# Patient Record
Sex: FEMALE | Race: BLACK OR AFRICAN AMERICAN | Employment: FULL TIME | URBAN - METROPOLITAN AREA
[De-identification: names, ages, dates, MRNs, and addresses within clinical notes are randomized per-mention and may not be internally consistent; named-entity substitution may affect disease eponyms.]

---

## 2017-09-15 ENCOUNTER — HOSPITAL ENCOUNTER (OUTPATIENT)
Dept: VASCULAR SURGERY | Age: 52
Discharge: HOME | End: 2017-09-15
Attending: FAMILY MEDICINE
Payer: COMMERCIAL

## 2017-09-15 DIAGNOSIS — M79.605 LEFT LEG PAIN: ICD-10-CM

## 2017-09-15 PROCEDURE — 93971 EXTREMITY STUDY: CPT

## 2017-09-15 NOTE — PROCEDURES
[facility]  *** FINAL REPORT ***    Name: Everette Barcenas  MRN: [id number]    Outpatient  : [id number]  HIS Order #: [id number]  [id number] [facility] Visit #: [id number]  Date: 15 Sep 2017    TYPE OF TEST: Peripheral Venous Testing    REASON FOR TEST  Limb swelling    Left Leg:-  Deep venous thrombosis:           No  Superficial venous thrombosis:    Not examined  Deep venous insufficiency:        Not examined  Superficial venous insufficiency: Not examined      INTERPRETATION/FINDINGS  Duplex images were obtained using 2-D gray scale, color flow, and  spectral Doppler analysis. Left leg :  1. Deep vein(s) visualized include the common femoral, proximal  femoral, mid femoral, distal femoral, popliteal(above knee),  popliteal(fossa), popliteal(below knee), posterior tibial and peroneal   veins. 2. No evidence of deep venous thrombosis detected in the veins  visualized. 3. No evidence of deep vein thrombosis in the contralateral common  femoral vein. 4. Superficial vein(s) visualized include the great saphenous vein. ADDITIONAL COMMENTS    I have personally reviewed the data relevant to the interpretation of  this  study. TECHNOLOGIST: Rasheed Delarosa, [facility], RVT/  Signed: 09/15/2017 02:45 PM    PHYSICIAN: Derek Dow D.O.   Signed: 2017 09:02 AM

## 2019-03-22 ENCOUNTER — HOSPITAL ENCOUNTER (EMERGENCY)
Age: 54
Discharge: HOME | End: 2019-03-22
Attending: EMERGENCY MEDICINE
Payer: COMMERCIAL

## 2019-03-22 VITALS
DIASTOLIC BLOOD PRESSURE: 80 MMHG | BODY MASS INDEX: 50.04 KG/M2 | WEIGHT: 293 LBS | TEMPERATURE: 98.8 F | SYSTOLIC BLOOD PRESSURE: 141 MMHG | RESPIRATION RATE: 16 BRPM | HEART RATE: 76 BPM | OXYGEN SATURATION: 100 %

## 2019-03-22 DIAGNOSIS — T78.40XA ALLERGIC REACTION, INITIAL ENCOUNTER: Primary | ICD-10-CM

## 2019-03-22 PROCEDURE — 74011636637 HC RX REV CODE- 636/637: Performed by: EMERGENCY MEDICINE

## 2019-03-22 PROCEDURE — 74011250637 HC RX REV CODE- 250/637: Performed by: EMERGENCY MEDICINE

## 2019-03-22 PROCEDURE — 99283 EMERGENCY DEPT VISIT LOW MDM: CPT

## 2019-03-22 RX ORDER — FAMOTIDINE 20 MG/1
20 TABLET, FILM COATED ORAL
Status: COMPLETED | OUTPATIENT
Start: 2019-03-22 | End: 2019-03-22

## 2019-03-22 RX ORDER — PREDNISONE 20 MG/1
60 TABLET ORAL
Status: COMPLETED | OUTPATIENT
Start: 2019-03-22 | End: 2019-03-22

## 2019-03-22 RX ORDER — FAMOTIDINE 20 MG/1
20 TABLET, FILM COATED ORAL DAILY
Qty: 10 TAB | Refills: 0 | Status: SHIPPED | OUTPATIENT
Start: 2019-03-22 | End: 2019-04-01

## 2019-03-22 RX ORDER — PREDNISONE 20 MG/1
60 TABLET ORAL DAILY
Qty: 12 TAB | Refills: 0 | Status: SHIPPED | OUTPATIENT
Start: 2019-03-22 | End: 2019-03-26

## 2019-03-22 RX ADMIN — PREDNISONE 60 MG: 20 TABLET ORAL at 05:02

## 2019-03-22 RX ADMIN — FAMOTIDINE 20 MG: 20 TABLET, FILM COATED ORAL at 05:02

## 2019-03-22 NOTE — ED PROVIDER NOTES
Pt c/o throat tingling and lip tingling/itching and possible lip swelling x 3 days, waxing and waning. Mild. Mild finger itching also, but resolved. No diff swallowing, no sob, no abd pain/chest pain, no prior similar sx's. No meds taken for sx's. Took off brand motrin prior to sx's starting, no other new meds or possible allergens per pt. Past Medical History:  
Diagnosis Date  Arthritis of both knees  Asthma  Chronic back pain  Left foot pain  Morbid obesity ([facility] [address]  Morbid obesity with BMI of 50.0-59.9, adult ([facility] [address] Past Surgical History:  
Procedure Laterality Date  HX HERNIA REPAIR    
 bilat inguinal  
 
   
No family history on file. Social History Socioeconomic History  Marital status: UNKNOWN Spouse name: Not on file  Number of children: Not on file  Years of education: Not on file  Highest education level: Not on file Occupational History  Not on file Social Needs  Financial resource strain: Not on file  Food insecurity:  
  Worry: Not on file Inability: Not on file  Transportation needs:  
  Medical: Not on file Non-medical: Not on file Tobacco Use  Smoking status: Never Smoker Substance and Sexual Activity  Alcohol use: Yes Comment: social/ occ  Drug use: No  
 Sexual activity: Never Lifestyle  Physical activity:  
  Days per week: Not on file Minutes per session: Not on file  Stress: Not on file Relationships  Social connections:  
  Talks on phone: Not on file Gets together: Not on file Attends Rastafari service: Not on file Active member of club or organization: Not on file Attends meetings of clubs or organizations: Not on file Relationship status: Not on file  Intimate partner violence:  
  Fear of current or ex partner: Not on file Emotionally abused: Not on file Physically abused: Not on file Forced sexual activity: Not on file Other Topics Concern  Not on file Social History Narrative  Not on file ALLERGIES: Penicillins Review of Systems Constitutional: Negative for fever. HENT: Negative for congestion. Respiratory: Negative for cough and shortness of breath. Cardiovascular: Negative for chest pain. Gastrointestinal: Negative for abdominal pain and vomiting. Musculoskeletal: Negative for back pain. Skin: Negative for rash. Neurological: Negative for light-headedness. All other systems reviewed and are negative. Vitals:  
 03/22/19 9026 BP: 141/80 Pulse: 76 Resp: 16 Temp: 98.8 °F (37.1 °C) SpO2: 100% Weight: 140.6 kg (310 lb) Physical Exam  
Constitutional: She is oriented to person, place, and time. She appears well-developed. HENT:  
Head: Normocephalic and atraumatic. Nl oropharyngeal exam. No uvula/pharyngeal/lip swelling. florencio secretions without diff. Eyes: Pupils are equal, round, and reactive to light. Neck: Normal range of motion. Cardiovascular: Normal rate and regular rhythm. No murmur heard. Pulmonary/Chest: Effort normal. She has no wheezes. Abdominal: Soft. There is no tenderness. Musculoskeletal: She exhibits no tenderness. Neurological: She is alert and oriented to person, place, and time. Skin: Skin is dry. Capillary refill takes less than 2 seconds. No rash noted. She is not diaphoretic. Psychiatric: She has a normal mood and affect. Nursing note and vitals reviewed. MDM Procedures Vitals: 
Patient Vitals for the past 12 hrs: 
 Temp Pulse Resp BP SpO2  
03/22/19 0439 98.8 °F (37.1 °C) 76 16 141/80 100 % Medications ordered:  
Medications  
famotidine (PEPCID) tablet 20 mg (20 mg Oral Given 3/22/19 0502) predniSONE (DELTASONE) tablet 60 mg (60 mg Oral Given 3/22/19 0502) Lab findings: 
No results found for this or any previous visit (from the past 12 hour(s)). X-Ray, CT or other radiology findings or impressions: No orders to display Progress notes, Consult notes or additional Procedure notes:  
No s/o rxn. To start meds for poss rxn. No indication for epipen. No emc. Stable for dc and close f/u. Detailed ret inst given. pt to avoid new off brand motrin she tried this week. Stable for dc and close f/u. Pt verbalizes understanding of detailed return instructions given. No emc. No cardioresp sx's. No oropharyngeal swelling. Diagnosis: 1. Allergic reaction, initial encounter Disposition: home Follow-up Information Follow up With Specialties Details Why Contact Info Oma Poe MD Internal Medicine Schedule an appointment as soon as possible for a visit in 1 day  [phone] [facility] [address] 
[phone] [id number] [facility] EMERGENCY DEPT Emergency Medicine Go to As needed, If symptoms worsen [address] [facility] [id number] [phone] Discharge Medication List as of 3/22/2019  4:59 AM  
  
START taking these medications Details  
famotidine (PEPCID) 20 mg tablet Take 1 Tab by mouth daily for 10 days. , Print, Disp-10 Tab, R-0  
  
predniSONE (DELTASONE) 20 mg tablet Take 60 mg by mouth daily for 4 days. , Print, Disp-12 Tab, R-0

## 2019-03-22 NOTE — LETTER
NOTIFICATION RETURN TO WORK / SCHOOL 
 
3/22/2019 4:49 AM 
 
Ms. Aureliano Ibrahim [address] [address] To Whom It May Concern: Aureliano Ibrahim is currently under the care of [id number] [facility] EMERGENCY DEPT. She will return to work/school on: 3/25/19 If there are questions or concerns please have the patient contact our office.  
 
 
 
Sincerely, 
 
 
Anamaria Norman MD

## 2019-03-22 NOTE — DISCHARGE INSTRUCTIONS
Return for pain, any difficulty swallowing or swelling, fever not resolving with motrin or tylenol, any shortness of breath, vomiting, decreased fluid intake, weakness, numbness, dizziness, or any change or concerns. Take benadryl over the counter as directed for the next 24 hours, then as needed. Patient Education        Allergic Reaction: Care Instructions  Your Care Instructions    An allergic reaction is an excessive response from your immune system to a medicine, chemical, food, insect bite, or other substance. A reaction can range from mild to life-threatening. Some people have a mild rash, hives, and itching or stomach cramps. In severe reactions, swelling of your tongue and throat can close up your airway so that you cannot breathe. Follow-up care is a key part of your treatment and safety. Be sure to make and go to all appointments, and call your doctor if you are having problems. It's also a good idea to know your test results and keep a list of the medicines you take. How can you care for yourself at home? · If you know what caused your allergic reaction, be sure to avoid it. Your allergy may become more severe each time you have a reaction. · Take an over-the-counter antihistamine, such as cetirizine (Zyrtec) or loratadine (Claritin), to treat mild symptoms. Read and follow directions on the label. Some antihistamines can make you feel sleepy. Do not give antihistamines to a child unless you have checked with your doctor first. Mild symptoms include sneezing or an itchy or runny nose; an itchy mouth; a few hives or mild itching; and mild nausea or stomach discomfort. · Do not scratch hives or a rash. Put a cold, moist towel on them or take cool baths to relieve itching. Put ice packs on hives, swelling, or insect stings for 10 to 15 minutes at a time. Put a thin cloth between the ice pack and your skin. Do not take hot baths or showers. They will make the itching worse.   · Your doctor may prescribe a shot of epinephrine to carry with you in case you have a severe reaction. Learn how to give yourself the shot and keep it with you at all times. Make sure it is not . · Go to the emergency room every time you have a severe reaction, even if you have used your shot of epinephrine and are feeling better. Symptoms can come back after a shot. · Wear medical alert jewelry that lists your allergies. You can buy this at most drugstores. · If your child has a severe allergy, make sure that his or her teachers, babysitters, coaches, and other caregivers know about the allergy. They should have an epinephrine shot, know how and when to give it, and have a plan to take your child to the hospital.  When should you call for help? Give an epinephrine shot if:    · You think you are having a severe allergic reaction.     · You have symptoms in more than one body area, such as mild nausea and an itchy mouth.    After giving an epinephrine shot call 911, even if you feel better.   Call 911 if:    · You have symptoms of a severe allergic reaction. These may include:  ? Sudden raised, red areas (hives) all over your body. ? Swelling of the throat, mouth, lips, or tongue. ? Trouble breathing. ? Passing out (losing consciousness). Or you may feel very lightheaded or suddenly feel weak, confused, or restless.     · You have been given an epinephrine shot, even if you feel better.    Call your doctor now or seek immediate medical care if:    · You have symptoms of an allergic reaction, such as:  ? A rash or hives (raised, red areas on the skin). ? Itching. ? Swelling. ? Belly pain, nausea, or vomiting.    Watch closely for changes in your health, and be sure to contact your doctor if:    · You do not get better as expected. Where can you learn more? Go to http://kee-delfin.info/. Enter S687 in the search box to learn more about \"Allergic Reaction: Care Instructions. \"  Current as of: June 27, 2018  Content Version: 11.9  © 2889-9942 [employer]. Care instructions adapted under license by [employer] (which disclaims liability or warranty for this information). If you have questions about a medical condition or this instruction, always ask your healthcare professional. [address] any warranty or liability for your use of this information.

## 2019-03-22 NOTE — ED TRIAGE NOTES
Patient c/o possible allergic reaction with c/o lip swelling and hoarse voice. She states it started last Tuesday and went away and presented again tonight. Denies any new medications, makeup or lotions. Patient states she started taking a OTC Ibuprofen gelcap that she doesn't normally take, for arthritic pain.

## 2019-06-24 ENCOUNTER — HOSPITAL ENCOUNTER (OUTPATIENT)
Dept: GENERAL RADIOLOGY | Age: 54
Discharge: HOME | End: 2019-06-24
Payer: COMMERCIAL

## 2019-06-24 ENCOUNTER — HOSPITAL ENCOUNTER (OUTPATIENT)
Dept: LAB | Age: 54
Discharge: HOME | End: 2019-06-24
Payer: COMMERCIAL

## 2019-06-24 DIAGNOSIS — M79.651 ACUTE PAIN OF RIGHT THIGH: ICD-10-CM

## 2019-06-24 DIAGNOSIS — M25.511 RIGHT SHOULDER PAIN: ICD-10-CM

## 2019-06-24 LAB — SENTARA SPECIMEN COL,SENBCF: NORMAL

## 2019-06-24 PROCEDURE — 99001 SPECIMEN HANDLING PT-LAB: CPT

## 2019-06-24 PROCEDURE — 73030 X-RAY EXAM OF SHOULDER: CPT

## 2019-06-24 PROCEDURE — 73501 X-RAY EXAM HIP UNI 1 VIEW: CPT

## 2021-06-14 ENCOUNTER — TRANSCRIBE ORDER (OUTPATIENT)
Dept: SCHEDULING | Age: 56
End: 2021-06-14

## 2021-06-14 DIAGNOSIS — Z12.39 ENCOUNTER FOR OTHER SCREENING FOR MALIGNANT NEOPLASM OF BREAST: Primary | ICD-10-CM

## 2021-11-10 ENCOUNTER — TRANSCRIBE ORDER (OUTPATIENT)
Dept: SCHEDULING | Age: 56
End: 2021-11-10

## 2021-11-10 DIAGNOSIS — Z12.31 VISIT FOR SCREENING MAMMOGRAM: Primary | ICD-10-CM

## 2022-06-06 ENCOUNTER — OFFICE VISIT (OUTPATIENT)
Dept: SURGERY | Age: 57
End: 2022-06-06
Payer: COMMERCIAL

## 2022-06-06 VITALS
BODY MASS INDEX: 45.96 KG/M2 | WEIGHT: 286 LBS | OXYGEN SATURATION: 100 % | SYSTOLIC BLOOD PRESSURE: 139 MMHG | HEART RATE: 89 BPM | HEIGHT: 66 IN | TEMPERATURE: 97.5 F | DIASTOLIC BLOOD PRESSURE: 73 MMHG

## 2022-06-06 DIAGNOSIS — Z98.84 S/P LAPAROSCOPIC SLEEVE GASTRECTOMY: ICD-10-CM

## 2022-06-06 DIAGNOSIS — J45.909 UNCOMPLICATED ASTHMA, UNSPECIFIED ASTHMA SEVERITY, UNSPECIFIED WHETHER PERSISTENT: ICD-10-CM

## 2022-06-06 DIAGNOSIS — G89.29 CHRONIC MIDLINE LOW BACK PAIN WITH SCIATICA, SCIATICA LATERALITY UNSPECIFIED: ICD-10-CM

## 2022-06-06 DIAGNOSIS — M19.90 OSTEOARTHRITIS, UNSPECIFIED OSTEOARTHRITIS TYPE, UNSPECIFIED SITE: ICD-10-CM

## 2022-06-06 DIAGNOSIS — E66.01 MORBID OBESITY WITH BMI OF 45.0-49.9, ADULT (HCC): ICD-10-CM

## 2022-06-06 DIAGNOSIS — M17.0 ARTHRITIS OF BOTH KNEES: ICD-10-CM

## 2022-06-06 DIAGNOSIS — E66.01 MORBID OBESITY (HCC): Primary | ICD-10-CM

## 2022-06-06 DIAGNOSIS — D64.9 ANEMIA, UNSPECIFIED TYPE: ICD-10-CM

## 2022-06-06 DIAGNOSIS — E55.9 VITAMIN D DEFICIENCY: ICD-10-CM

## 2022-06-06 DIAGNOSIS — M54.40 CHRONIC MIDLINE LOW BACK PAIN WITH SCIATICA, SCIATICA LATERALITY UNSPECIFIED: ICD-10-CM

## 2022-06-06 DIAGNOSIS — K90.9 INTESTINAL MALABSORPTION, UNSPECIFIED TYPE: ICD-10-CM

## 2022-06-06 PROCEDURE — 99215 OFFICE O/P EST HI 40 MIN: CPT | Performed by: SPECIALIST

## 2022-06-06 RX ORDER — CYCLOBENZAPRINE HYDROCHLORIDE 7.5 MG/1
TABLET, FILM COATED ORAL
COMMUNITY

## 2022-06-06 RX ORDER — ERGOCALCIFEROL 1.25 MG/1
CAPSULE ORAL
COMMUNITY

## 2022-06-06 RX ORDER — NAPROXEN 500 MG/1
TABLET ORAL
COMMUNITY
Start: 2022-05-19

## 2022-06-06 RX ORDER — DICLOFENAC SODIUM 10 MG/G
GEL TOPICAL
COMMUNITY
Start: 2022-04-11

## 2022-06-06 NOTE — PROGRESS NOTES
Revision Surgery Consultation    Subjective: The patient is a 49 y.o. obese female with a Body mass index is 46.16 kg/m². .  The patient had a laparoscopic sleeve gastrectomy procedure done approximately 1.83 years ago in [facility] by Dr. Margo Gómez.  her starting weight prior to surgery was 340 lbs. she ultimately lost approximately 65 lbs with a subsequent weight regain of 11 lbs. her peak EBWL at 1.5 years out from surgery was 32% and now her current EBWL is 27%. her last bariatric follow-up was 1.5 years ago with Dr. Margo Gómez. Deborah Rosales notes that she had no issues in the immediate post-op phase and had no hospital readmissions in the remote post-op phase. she currently is having the following issues related to her health:lack of weight loss. she is here today to discuss a possible revision of her sleeve gastrectomy because of lack of weight loss. All of their prior evaluations available by both their PCP's and specialists physicians have been reviewed today either in the Care Everywhere portal or scanned under the media tab. I have spent a large portion of my initial consultation today reviewing the patients current dietary habits which have contributed to their health issues, weight regain and  their current obesity. They understand that generally speaking,  weight regain is  a function of resuming less that ideal dietary habits instead of being a procedural issue. They understand that older procedures are more likely to be associated with a less that perfect procedural result, such as a prior vertical banded gastroplasty or non divided gastric bypass. These procedures are more likely to result in staple line failures with resultant weight regain. This has been explained to the patient via diagrams of these older procedures and given to the patient. I have suggested to them personally a dietary regimen that they can initiate now to help with their status as it pertains to their weight.   They understand that the most important aspect of their journey through their weight loss endeavor will be their adherence to a new lifestyle of healthy eating behavior. They also understand that an adherence to an exercise program will not only help with weight loss but is ultimately important in weight maintenance.       Patient Active Problem List    Diagnosis Date Noted    Intestinal malabsorption 06/06/2022    S/P laparoscopic sleeve gastrectomy 06/06/2022    Left foot pain     Vitamin D deficiency     Morbid obesity with BMI of 45.0-49.9, adult (HCC)     Anemia     DJD (degenerative joint disease)     Chronic back pain     Morbid obesity ([facility] [address]     Arthritis of both knees     Asthma       Past Surgical History:   Procedure Laterality Date    HX HERNIA REPAIR      bilat inguinal    HX ORTHOPAEDIC      foot surgeries    GA LAP, ULISES RESTRICT PROC, LONGITUDINAL GASTRECTOMY  07/27/2020    Dr Leticia Fierro      Social History     Tobacco Use    Smoking status: Never Smoker    Smokeless tobacco: Never Used   Substance Use Topics    Alcohol use: Yes     Comment: social/ occ      Family History   Adopted: Yes      Current Outpatient Medications   Medication Sig Dispense Refill    Cyclobenzaprine 7.5 mg tablet cyclobenzaprine 7.5 mg tablet   TAKE 1 TABLET BY MOUTH EVERYDAY AT BEDTIME      diclofenac (VOLTAREN) 1 % gel APPLY 1 GRAM TO THE AFFFECTED AREA(S) TWICE A DAY FOR 30 DAYS EXTERNAL TWICE A DAY 30 DAYS      ergocalciferol (ERGOCALCIFEROL) 1,250 mcg (50,000 unit) capsule ergocalciferol (vitamin D2) 1,250 mcg (50,000 unit) capsule   TAKE 1 CAP BY MOUTH ONCE A WEEK      naproxen (NAPROSYN) 500 mg tablet TAKE 1 TABLET WITH FOOD OR MILK AS NEEDED ORALLY EVERY 12 HOURS FOR 15 DAYS       Allergies   Allergen Reactions    Penicillin G Hives     Swelling    Penicillins Hives and Itching    Stings [Sting, Bee] Hives     Swelling ,shortness of breath          Review of Systems:            General - No history or complaints of unexpected fever, chills, or weight loss  Head/Neck - No history or complaints of headache, diplopia, dysphagia, hearing loss  Cardiac - No history or complaints of chest pain, palpitations, murmur, or shortness of breath  Pulmonary - No history or complaints of shortness of breath, productive cough, hemoptysis  Gastrointestinal - No history or complaints of reflux,  abdominal pain, obstipation/constipation, blood per rectum  Genitourinary - No history or complaints of hematuria/dysuria, stress urinary incontinence symptoms, or renal lithiasis  Musculoskeletal - No history or complaints of joint pain or muscular weakness  Hematologic - No history or complaints of bleeding disorders, blood transfusions, sickle cell anemia  Neurologic - No history or complaints of  migraine headaches, seizure activity, syncopal episodes, TIA or stroke  Integumentary - No history or complaints of rashes, abnormal nevi, skin cancer  Gynecological - No history of heavy menses/abnormal menses           Objective:     Visit Vitals  /73 (BP 1 Location: Right upper arm, BP Patient Position: Sitting, BP Cuff Size: Large adult long)   Pulse 89   Temp 97.5 °F (36.4 °C)   Ht 5' 6\" (1.676 m)   Wt 129.7 kg (286 lb)   SpO2 100%   BMI 46.16 kg/m²       Physical Examination: General appearance - alert, well appearing, and in no distress and oriented to person, place, and time  Mental status - alert, oriented to person, place, and time, normal mood, behavior, speech, dress, motor activity, and thought processes  Eyes - pupils equal and reactive, extraocular eye movements intact, sclera anicteric, left eye normal, right eye normal  Ears - right ear normal, left ear normal  Nose - normal and patent, no erythema, discharge or polyps  Mouth - mucous membranes moist, pharynx normal without lesions  Neck - supple, no significant adenopathy  Lymphatics - no palpable lymphadenopathy, no hepatosplenomegaly  Chest - clear to auscultation, no wheezes, rales or rhonchi, symmetric air entry  Heart - normal rate, regular rhythm, normal S1, S2, no murmurs, rubs, clicks or gallops  Abdomen - soft, nontender, nondistended, no masses or organomegaly, Short xiphoid to umbilical distance  Back exam - full range of motion, no tenderness, palpable spasm or pain on motion  Neurological - alert, oriented, normal speech, no focal findings or movement disorder noted  Musculoskeletal - no joint tenderness, deformity or swelling  Extremities - peripheral pulses normal, no pedal edema, no clubbing or cyanosis  Skin - normal coloration and turgor, no rashes, no suspicious skin lesions noted    Labs / Old Records:         OPERATION    LOCATION OF PATIENT:  Room: - [facility]  Patient: Omari Gonzalez  STACI: [id number]    DATE OF OPERATION:  7/27/2020  Case Tracking Events   Event Time In   Procedure Start 07/27/2020 6002   Procedure End       PREOPERATIVE DIAGNOSIS:   Morbid obesity. POSTOPERATIVE DIAGNOSIS:   Morbid obesity. Hepatomegaly with fatty changes grossly   OPERATION PERFORMED:   Laparoscopic vertical sleeve gastrectomy. Wedge Liver Biopsy  SURGEON: Yimi Simmons MD     ASSISTANT: Tang Braun. The assistant was essential in aiding with exposure, retraction, and hemostasis of the surgical field during the operative procedure. ANESTHESIA:General    Patient received Cipro IV piggyback preoperatively. ESTIMATED BLOOD LOSS: Minimal  SPECIMEN: Wedge Liver Biopsy   DEVICES/IMPLANTS: none  COMPLICATIONS: None  TIME OF SURGERY: See OR record   INDICATIONS FOR OPERATION:   This is a 16-year-old morbidly obese black female presents for evaluation concerning a possible laparoscopic vertical sleeve gastrectomy for weight control. She has had slowly worsening obesity throughout her entire life. She now has worsening medical comorbidities associated with obesity. She had tried multiple weight loss plans in the past with no long-term success. She is 5 feet 6 inches tall, weighs 340 pounds. This gives a body mass index approximately 55.1. Her ideal body weight is somewhere around 155 pounds. She has associated medical problems including chronic back pain, shortness of breath on exertion, history of asthma, and degenerative joint disease. She has undergone extensive evaluation preoperatively including both dietary and psychological consultations. She is to undergo a laparoscopic vertical sleeve gastrectomy for weight control.     FINDINGS:   In the operating suite, under general anesthetic, CO2   pneumoperitoneum was accomplished using the Optiview scope in the   left upper quadrant area. Upon visualizing the intraabdominal   contents, there was evidence of fatty changes of the liver. There were no other obvious abnormalities. All   remaining trocars were placed under direct visualization. A 5 mm   trocar in the right lateral abdomen, two 5 mm trocars in the left   lateral abdomen, and a 15 mm trocar just above and to the right of   the umbilicus. A small stab incision was made in the subxiphoid   area for placing the [facility] retractor. The stomach was   mobilized using the Harmonic Scalpel. Hemostasis was   accomplished with this device as well as a hemoclip applier. The sleeve gastrectomy was accomplished using a 36 Kazakh VisiGi   dilator placed orogastrically by the anesthesiologist as a sizer and decompressor tube   for the remaining tube of stomach. The specimen was removed from   the 15mm port site. The fascia at this port site  was closed with 0 PDS suture. DESCRIPTION OF OPERATION:   The patient was taken to the operating suite, placed in supine   position and given a general anesthetic. The abdomen was then   sterilely prepped and draped. The patient was then placed in a   mild reverse Trendelenburg position.  The skin incision lines   were all marked and 0.5% Marcaine with epinephrine was used to   infiltrate the skin and fascia of each of the trocar sites. The   Optiview scope was used to obtain pneumoperitoneum without   difficulty. All remaining trocars were placed as noted above in   the findings. The dissection was begun by incising the   peritoneum at the angle of His, and visualizing the left eloy of   the diaphragm. This was fairly easily identified. There was no   evidence of a hiatal hernia. The redundant fatty pad was excised from this  area, removed from the abdomen, and discarded. Mobilization of the greater curve of the stomach   was then accomplished using the Harmonic Scalpel by mobilizing   the mid portion of the greater curvature and continuing this all   the way up to the previously identified left eloy of the   diaphragm. The short gastric vessels were carefully taken down. Hemostasis was noted to be quite good with using the ultrasonic dissector and the hemaclip applier. Dissection was then continued more distally along the   greater curve of the stomach, further towards the antrum, all the   way down to approximately 4-5 cm proximal to the pylorus. The   filmy adhesions posterior to the stomach were taken down in   similar fashion. Once this was accomplished, the orogastric 36 Japanese Masina 49  tube was advanced by the anesthesiologist. The resection was begun approximately 4-5 cm proximal to the pyloric valve. The 39 Peruvian dilator   was used to size the remaining gastric sleeve, and a MALIKA stapling   technique was used to take this all way up to the angle of His. The first stapling was with a purple load. All remaining   staplings were also done with the purple reloads. Once the fundus and   remainder of the stomach was completely  from the sleeve,   the bed was then carefully observed. Hemostasis was checked   for and noted to be quite good. The integrity of the staple line was then   checked using saline, approximately 30- 40 cc were   insufflated. There was no evidence of any leakage.  This was   then all suctioned free via the orogastric tube, and the   orogastric tube subsequently removed. A wedge liver biopsy was then obtained sharply from the anterior aspect of the left lobe of the liver. The specimen removed and sent to pathology. Hemostasis was easily accomplished using electrocautery. The resected stomach was then grasped and pulled up   through the 15 mm trocar site. The 15 mm trocar was removed. The fascia at this site was stretched minimally to allow removal   of the remaining specimen. This area was then irrigated with   copious amounts of saline. The fascial defect of the 15 mm   trocar was closed with 0 PDS sutures in interrupted fashion. All   remaining trocars were then removed under direct visualization. Hemostasis at the abdominal wall trocar sites was noted to be   quite good as well. The patient was then returned to the supine   position. Pneumoperitoneum was completely released. The   subcutaneous tissue was irrigated with copious amounts of saline. Effluent was suctioned free, and the skin was closed with   interrupted subcuticular sutures of 4-0 Monocryl. Sterile   dressings were applied over the incision sites. The patient was   awakened, taken to the recovery room in stable condition. Sponge, needle, instrument count were correct x2 at the end of the  procedure. CONDITION AT TERMINATION OF OPERATION:   The patient was returned to the recovery room in stable   condition. ______________________________________   SIGNED: Ankit Moreno MD*                  Assessment:     Morbid obesity status post laparoscopic sleeve gastrectomy procedure approximately 1.83 years ago with complaint of poor weight loss. Plan: 1. Weight regain-Today in our office I had a lengthy discussion with Atiya Wall regarding the nature of their prior procedure. We discussed the anatomical changes to their anatomy and how this relates to  contributing weight regain.  Our office will continue to attempt to obtain any medical records, if not obtained or available already ,related to their procedure. It was also discussed today that before any decisions can be made regarding a possible revision of their initial  procedure that an upper GI swallow study must be obtained to evaluate their post surgical anatomy. They understand that in patients with a prior open gastric bypass, those patients are not revision candidates due to adhesive disease usually, and the fact that post surgical anatomy  usually can not be improved upon. They understand if their gastric bypass was performed laparoscopically, then this situation might be more amendable to gastric band placement over their prior gastric bypass. They understand, as I have explained today, that the adhesive disease associated with prior  procedures is at times a rate limiting factor. This precludes our ability to perform a revision procedure safely. The factors that contribute to this are increased risk such as age, health issues and increased risk from a procedural standpoint and have been discussed today. We will proceed with the UGI swallow study as described above. The patient understands all of the above and wishes to proceed with the study. 2.Nutrition-  I have discussed in detail the pitfalls in diet that have contributed to their weight regain and the importance of adhering to a lifelong regimen of dietary goals and proper eating habits. I have discussed the proper lifelong bariatric diet  in detail spending in excess of 20 minutes discussing this. We will schedule them for a dietary consultation with our nutritionist and urge them to continue on a regular follow-up schedule with her. 3.Maintenance vitamins- Today we have discussed the importance of vitamins as it pertains to their procedure and we will obtain appropriate lab to check all levels. They have been provided a handout regarding this today.  I have given the patient a lab slip for her routine labs. Total time spent with the patient reviewing their complex history of bariatric surgery,diet, and plan is in excess of 60 minutes. Secondary Diagnoses:     Restrictive Airway Disease - We will continue all of their pulmonary medications in the form of oral pills and inhalers in both the perioperative and postoperative period. They understand that their symptoms should improve with weight loss. Any further testing related to this will be turned over to their family physician or pulmonologist. The patient understands that if they require oral or IV steroids in the future that they will notify us. This is particularly important for gastric bypass patients at all times and both sleeve gastrectomy and gastric bypass patients in the 1 month pre op and 1 month post operative period. They understand that inhaled steroids are exempt from this. Weight Related Arthritis -The patient understands the benefits that weight loss surgery can have on their arthritis but also understands that weight loss is not a guaranteed cure and relief of symptoms is often dependent on the severity of the underlying disease. The patient also understands that traditional pharmaceutical treatments for this diagnosis are usually unavailable to post-operative weight loss patients due to the effects on the gastrointestinal tract. Any changes to the patients medication treatment will ultimately be made the patients PCP with input by our office. Dietary Intervention  - The patient is currently scheduled to see or has been followed by a bariatric nutritionist for an attempt at preoperative weight loss as has been dictated by their insurance carrier. They will be assessed at various times during their follow up to evaluate their progress depending on the length of time that is required once again by their carrier.   I have explained the importance of preoperative weight loss and the benefits regarding lower surgical risk and also assisting the patient in reaching their weight loss goal. They understand also that they should participate in an exercise program to assist in this weight loss. Finally they understand there is a physiologic benefit from the standpoint of hepatic volume reduction and reduction of central visceral adiposity preoperatively. I have reiterated the importance of a low carbohydrate and high protein regimen to achieve their stated goal. I have reviewed their current eating behavior prior to this encounter and explained to them in an exhaustive fashion the appropriate diet that they should adhere to. They have been encouraged to loose weight pre operatively and understand it is our prerogative to cancel surgery or postpone their procedure in the event of significant weight gain. The patients weight loss goal pre operatively is 10 pounds. Total time spent with the patient reviewing their complex history of bariatric surgery,diet, and plan is in excess of 60 minutes.     Signed By: Xavier Quiroz MD     June 6, 2022

## 2022-06-22 ENCOUNTER — APPOINTMENT (OUTPATIENT)
Dept: GENERAL RADIOLOGY | Age: 57
End: 2022-06-22
Attending: SPECIALIST
Payer: COMMERCIAL

## 2022-06-22 ENCOUNTER — HOSPITAL ENCOUNTER (OUTPATIENT)
Dept: LAB | Age: 57
Discharge: HOME | End: 2022-06-22
Payer: COMMERCIAL

## 2022-06-22 ENCOUNTER — HOSPITAL ENCOUNTER (OUTPATIENT)
Age: 57
Setting detail: OUTPATIENT SURGERY
Discharge: HOME | End: 2022-06-22
Attending: SPECIALIST | Admitting: SPECIALIST
Payer: COMMERCIAL

## 2022-06-22 VITALS
TEMPERATURE: 97.8 F | WEIGHT: 274.5 LBS | HEIGHT: 66 IN | RESPIRATION RATE: 16 BRPM | HEART RATE: 55 BPM | BODY MASS INDEX: 44.11 KG/M2 | SYSTOLIC BLOOD PRESSURE: 139 MMHG | OXYGEN SATURATION: 100 % | DIASTOLIC BLOOD PRESSURE: 74 MMHG

## 2022-06-22 DIAGNOSIS — E66.01 MORBID OBESITY WITH BMI OF 45.0-49.9, ADULT (HCC): ICD-10-CM

## 2022-06-22 DIAGNOSIS — E66.01 MORBID OBESITY (HCC): ICD-10-CM

## 2022-06-22 DIAGNOSIS — E55.9 VITAMIN D DEFICIENCY: ICD-10-CM

## 2022-06-22 DIAGNOSIS — J45.909 UNCOMPLICATED ASTHMA, UNSPECIFIED ASTHMA SEVERITY, UNSPECIFIED WHETHER PERSISTENT: ICD-10-CM

## 2022-06-22 DIAGNOSIS — K90.9 INTESTINAL MALABSORPTION, UNSPECIFIED TYPE: ICD-10-CM

## 2022-06-22 DIAGNOSIS — D64.9 ANEMIA, UNSPECIFIED TYPE: ICD-10-CM

## 2022-06-22 DIAGNOSIS — M17.0 ARTHRITIS OF BOTH KNEES: ICD-10-CM

## 2022-06-22 DIAGNOSIS — Z98.84 S/P LAPAROSCOPIC SLEEVE GASTRECTOMY: ICD-10-CM

## 2022-06-22 LAB
25(OH)D3 SERPL-MCNC: 61.3 NG/ML (ref 30–100)
ALBUMIN SERPL-MCNC: 3.8 G/DL (ref 3.4–5)
ALBUMIN/GLOB SERPL: 1 {RATIO} (ref 0.8–1.7)
ALP SERPL-CCNC: 91 U/L (ref 45–117)
ALT SERPL-CCNC: 16 U/L (ref 13–56)
ANION GAP SERPL CALC-SCNC: 2 MMOL/L (ref 3–18)
AST SERPL-CCNC: 10 U/L (ref 10–38)
BASOPHILS # BLD: 0.1 K/UL (ref 0–0.1)
BASOPHILS NFR BLD: 1 % (ref 0–2)
BILIRUB SERPL-MCNC: 0.5 MG/DL (ref 0.2–1)
BUN SERPL-MCNC: 12 MG/DL (ref 7–18)
BUN/CREAT SERPL: 16 (ref 12–20)
CALCIUM SERPL-MCNC: 9.4 MG/DL (ref 8.5–10.1)
CHLORIDE SERPL-SCNC: 110 MMOL/L (ref 100–111)
CO2 SERPL-SCNC: 30 MMOL/L (ref 21–32)
CREAT SERPL-MCNC: 0.77 MG/DL (ref 0.6–1.3)
DIFFERENTIAL METHOD BLD: ABNORMAL
EOSINOPHIL # BLD: 0.2 K/UL (ref 0–0.4)
EOSINOPHIL NFR BLD: 3 % (ref 0–5)
ERYTHROCYTE [DISTWIDTH] IN BLOOD BY AUTOMATED COUNT: 15.2 % (ref 11.6–14.5)
FERRITIN SERPL-MCNC: 22 NG/ML (ref 8–388)
FOLATE SERPL-MCNC: 16 NG/ML (ref 3.1–17.5)
GLOBULIN SER CALC-MCNC: 4 G/DL (ref 2–4)
GLUCOSE SERPL-MCNC: 92 MG/DL (ref 74–99)
HCT VFR BLD AUTO: 38 % (ref 35–45)
HGB BLD-MCNC: 11.7 G/DL (ref 12–16)
IMM GRANULOCYTES # BLD AUTO: 0 K/UL (ref 0–0.04)
IMM GRANULOCYTES NFR BLD AUTO: 0 % (ref 0–0.5)
IRON SERPL-MCNC: 41 UG/DL (ref 50–175)
LYMPHOCYTES # BLD: 2 K/UL (ref 0.9–3.6)
LYMPHOCYTES NFR BLD: 27 % (ref 21–52)
MCH RBC QN AUTO: 25.4 PG (ref 24–34)
MCHC RBC AUTO-ENTMCNC: 30.8 G/DL (ref 31–37)
MCV RBC AUTO: 82.6 FL (ref 78–100)
MONOCYTES # BLD: 0.4 K/UL (ref 0.05–1.2)
MONOCYTES NFR BLD: 5 % (ref 3–10)
NEUTS SEG # BLD: 4.7 K/UL (ref 1.8–8)
NEUTS SEG NFR BLD: 64 % (ref 40–73)
NRBC # BLD: 0 K/UL (ref 0–0.01)
NRBC BLD-RTO: 0 PER 100 WBC
PLATELET # BLD AUTO: 253 K/UL (ref 135–420)
PMV BLD AUTO: 12.4 FL (ref 9.2–11.8)
POTASSIUM SERPL-SCNC: 3.8 MMOL/L (ref 3.5–5.5)
PROT SERPL-MCNC: 7.8 G/DL (ref 6.4–8.2)
RBC # BLD AUTO: 4.6 M/UL (ref 4.2–5.3)
SODIUM SERPL-SCNC: 142 MMOL/L (ref 136–145)
VIT B12 SERPL-MCNC: 561 PG/ML (ref 211–911)
WBC # BLD AUTO: 7.3 K/UL (ref 4.6–13.2)

## 2022-06-22 PROCEDURE — 36415 COLL VENOUS BLD VENIPUNCTURE: CPT

## 2022-06-22 PROCEDURE — 82607 VITAMIN B-12: CPT

## 2022-06-22 PROCEDURE — 82728 ASSAY OF FERRITIN: CPT

## 2022-06-22 PROCEDURE — 82306 VITAMIN D 25 HYDROXY: CPT

## 2022-06-22 PROCEDURE — 74240 X-RAY XM UPR GI TRC 1CNTRST: CPT

## 2022-06-22 PROCEDURE — 80053 COMPREHEN METABOLIC PANEL: CPT

## 2022-06-22 PROCEDURE — 84425 ASSAY OF VITAMIN B-1: CPT

## 2022-06-22 PROCEDURE — 74240 X-RAY XM UPR GI TRC 1CNTRST: CPT | Performed by: SPECIALIST

## 2022-06-22 PROCEDURE — 76040000019: Performed by: SPECIALIST

## 2022-06-22 PROCEDURE — 85025 COMPLETE CBC W/AUTO DIFF WBC: CPT

## 2022-06-22 PROCEDURE — 83540 ASSAY OF IRON: CPT

## 2022-06-22 PROCEDURE — 74011000250 HC RX REV CODE- 250: Performed by: SPECIALIST

## 2022-06-22 NOTE — PROCEDURES
[facility]    Upper GI Procedure Report      [address] [id number] Record [id number]    1965    Date of Service - June 22, 2022    Pre-Op Diagnosis - patient is status post sleeve resection performed by Dr. Leticia Fierro 1.83 years ago with complaint of poor weight loss and weight regain. They now present for UGI to assess their post surgical anatomy. Post-Op Diagnosis -same    Procedure - UGI study with barium    Surgeon - Wallace Watkins MD    Assistant - None    Complications - None    Specimens - None    Implants - None    Estimate Blood Loss - None    Statement of Medical Necessity - Need for radiologic evaluation prior to further management of their care. .    Procedure -the patient was brought to the fluoroscopy suite where they were given thin barium. On swallowing the barium the patient was noted to have normal peristalsis of their esophagus with progressive flow into the distal esophagus. Specific findings of the distal esophagus revealed that they did not have a hiatal hernia. Contrast flowed normally through the esophagus and into the sleeve without reflux or obstruction. The stomach filled in a timely manner and emptied into the intestine without issue. The anatomy was abnormal for the timeframe in that there appears to be a large amount of retained pre-pyloric stomach with no stricture or obstruction. There is an obvious lack of uniformity of her sleeve   She is a candidate for conversion to a gastric bypass given the abnormal anatomy.     Wallace Watkins MD

## 2022-06-27 LAB — VIT B1 BLD-SCNC: 94.4 NMOL/L (ref 66.5–200)

## 2022-06-30 ENCOUNTER — HOSPITAL ENCOUNTER (OUTPATIENT)
Dept: BARIATRICS/WEIGHT MGMT | Age: 57
Discharge: HOME | End: 2022-06-30

## 2022-06-30 VITALS — BODY MASS INDEX: 44.07 KG/M2 | WEIGHT: 274.2 LBS | HEIGHT: 66 IN

## 2022-06-30 NOTE — PROGRESS NOTES
Medical Weight Loss Multi-Disciplinary Program    Patient's Name: Bam Almonte   Age: 49 y.o. YOB: 1965   Sex: female    Session #1. Pt attended in-person class. Weight obtained in office. Date: 6/30/2022    Visit Vitals  Ht 5' 6\" (1.676 m)   Wt 124.4 kg (274 lb 3.2 oz)   BMI 44.26 kg/m²       Pounds Lost since last month: N/A  Pounds Gained since last month: N/A    Starting Weight: 286  lbs   Previous Months Weight: N/A  Overall Pounds Lost: 11.8 lbs   Overall Pounds Gained: 0.0 lbs    Note that pt has a pre-operative weight loss goal of 10 lbs. Pt has met this goal.    Do you smoke? no    Alcohol intake:  Number of drinks per week: 1-2    Class Guidelines    Guidelines are reviewed with patient at the start of every class. 1. Patient understands that weight loss trial classes must be consecutive. Patient understands if they miss a class, it is their responsibility to contact me to reschedule class. I will reach out to patient after their first no show. 2.  Patient understands the expectations that weight maintenance/weight loss is expected during the classes. Failure to demonstrate changes may result in extension of weight loss trial, followed by returning to see the surgeon. Patient understands that they CANNOT gain any weight during the weight loss trial.  Gaining weight will result in extension of weight loss trial.  3. Patient is also instructed to complete their labwork, psychological evaluation visit, and any other tests that the surgeon has used while they are working on their weight loss trial.  4.  Patient was instructed to bring their packet of nutrition education materials to every class and appointment. Other Pertinent Information    Changes Made Since Last Class: Pt states that she is walking more and doing on-line aerobics. Eating Habits and Behaviors      Today in class we reviewed the Key Diet Principles.   Patient was encouraged to consume 3 meals each day, and meal timing was reviewed. Meal time behaviors that will help pt to be successful with their weight loss efforts were also discussed. We discussed the importance of drinking adequate amounts of fluids, recommending that patient consume a minimum of 64 oz of sugar-free, caffeine-free and carbonation-free fluids each day. Patient was encouraged to eliminate sugar-sweetened beverages such as sweet tea, fruit juice, fruit smoothies, flavored coffee drinks and regular sodas. During the weight loss trial, patient was encouraged to focus on eating protein-forward meals, with a daily goal of  grams protein. Patient was also advised to decrease carbohydrate intake to <100 g/d, choosing complex vs. simple carbohydrates in limited amounts. We also discussed limiting fat intake. Encouraged patient to follow the \"3-gram rule\" when choosing foods by looking for items containing <3 g of fat and sugar per serving. We reviewed meal planning guidelines and discussed appropriate meal and snack options. We also talked about appropriate protein drinks and patient was encouraged to start trying these, using them either for a meal replacement or a protein-rich snack. We reviewed the nutritional guidelines for selecting protein shakes. Pre-operative vitamin and mineral supplementation was reviewed. Patient was instructed to choose a chewable complete multivitamin with iron in NON-gummy form. Selection of probiotic was also reviewed. Patient's current diet habits include:  Patient is eating 3 meals and 3 snacks per day. Pt states that \"often I find myself skipping meals during the day at work, but I eat once home\". Patient is not measuring portions out. Patient indicates they are eating out 0 times per week. This includes fast food, carry out, and sit down restaurants. Per dietary recall, pt is consuming high protein, low carbohydrate diet. Pt is consuming inadequate water at only 16 oz/day.   Pt has selected 3 brands of protein supplement that she will use for meal replacement/high protein snack option pre-operatively and to help meet protein needs post-operatively. Physical Activity/Exercise    Comments:  Patient is currently walking or doing \"routines I find on YouTube\" for activity 15-30 min/d x 2-3 d/wk. We talked about recommended types of physical activity, including walking, swimming, cycling, or chair exercises. I also talked with patient about doing some strength training, which helps the metabolism, as well as strengthen muscles and bones. Patient's plan to incorporate more activity includes: \"increase water intake- Having foot surgery soon, so from there to increase physical activity. Behavior Modification     Comments:  During today's class, we discussed setting SMART goals as behavior change tool to improve eating behaviors, promote weight loss and long-term weight maintenance, encourage a healthy relationship with food, and prevent chronic disease. Patient was directed to the handouts on developing SMART goals that were provided in class, and each component of the acronym SMART was reviewed. Examples of appropriate SMART goals were provided and patient was instructed to use the worksheet provided to develop their own SMART goal related to nutrition and physical activity. Goals:   1. Work to increase to 3-4 small meals per day, with 1-2 planned snacks as needed. Recommend following the plate method for meal planning - focusing on lean protein, non-starchy vegetables, and measured amounts of complex carbohydrates. - Goal of  g protein and <100 g carbohydrates per day. - Select at least 2 DIFFERENT protein supplements that can be used for protein supplementation to meet goals pre- and post-operatively. - Practice Carbohydrate Counting and label reading.   - Follow 3 g rule for fat and sugar. 2. Slow down meals  - Chew each bite 25-35 times.   - Aim for 20-30 min/meal.  3. Increase non-caloric fluids to 64 oz per day. Eliminate caffeine, added sugar, carbonation, and straws.               - Continue to work to decrease sugar sweetened beverages - goal of calorie-free beverages only. - Must eliminate caffeine prior to surgery and avoid for ~6-8 weeks. - Practice 30:30 rule,  food and fluid intake. 4. Start activity regimen, work to increase ADLs. 5. Start Complete MVI and probiotic at least 30 days pre-op. Candidate for surgery (per RD): PENDING, Pt scheduled for nutrition education on 7/20/2022.

## 2022-07-20 ENCOUNTER — HOSPITAL ENCOUNTER (OUTPATIENT)
Dept: BARIATRICS/WEIGHT MGMT | Age: 57
Discharge: HOME | End: 2022-07-20

## 2022-07-20 VITALS — HEIGHT: 66 IN | WEIGHT: 275.3 LBS | BODY MASS INDEX: 44.24 KG/M2

## 2022-07-20 NOTE — PROGRESS NOTES
Medical Weight Loss Multi-Disciplinary Program    Patient's Name: Tyrel Kent   Age: 49 y.o. YOB: 1965   Sex: female    Session #2. Pt attended in-person class. Weight obtained in office. Date: 7/20/2022    Visit Vitals  Ht 5' 6\" (1.676 m)   Wt 124.9 kg (275 lb 4.8 oz)   BMI 44.43 kg/m²       Pounds Lost since last month: 0.0 lbs Pounds Gained since last month: 1.1 lbs    Starting Weight: 286 lbs   Previous Months Weight: 274.2 lbs  Overall Pounds Lost: 10.7 lbs   Overall Pounds Gained: 0.0 lbs    Note that pt has a pre-operative weight loss goal of 10 lbs. Pt has met this goal.    Do you smoke? no    Alcohol intake:  Number of drinks per week: 0-1    Class Guidelines    Guidelines are reviewed with patient at the start of every class. 1. Patient understands that weight loss trial classes must be consecutive. Patient understands if they miss a class, it is their responsibility to contact me to reschedule class. I will reach out to patient after their first no show. 2.  Patient understands the expectations that weight maintenance/weight loss is expected during the classes. Failure to demonstrate changes may result in extension of weight loss trial, followed by returning to see the surgeon. Patient understands that they CANNOT gain any weight during the weight loss trial.  Gaining weight will result in extension of weight loss trial.  3. Patient is also instructed to complete their labwork, psychological evaluation visit, and any other tests that the surgeon has used while they are working on their weight loss trial.  4.  Patient was instructed to bring their packet of nutrition education materials to every class and appointment. Other Pertinent Information    Changes Made Since Last Class: Pt states that she has increased her water intake and increased her walking. Eating Habits and Behaviors      Today in class we reviewed the Key Diet Principles.   Patient was encouraged to consume 3 meals each day, and meal timing was reviewed. Meal time behaviors that will help pt to be successful with their weight loss efforts were also discussed. We discussed the importance of drinking adequate amounts of fluids, recommending that patient consume a minimum of 64 oz of sugar-free, caffeine-free and carbonation-free fluids each day. Patient was encouraged to eliminate sugar-sweetened beverages such as sweet tea, fruit juice, fruit smoothies, flavored coffee drinks and regular sodas. During the weight loss trial, patient was encouraged to focus on eating protein-forward meals, with a daily goal of  grams protein. Patient was also advised to decrease carbohydrate intake to <100 g/d, choosing complex vs. simple carbohydrates in limited amounts. We also discussed limiting fat intake. Encouraged patient to follow the \"3-gram rule\" when choosing foods by looking for items containing <3 g of fat and sugar per serving. We reviewed meal planning guidelines and discussed appropriate meal and snack options. We also talked about appropriate protein drinks and patient was encouraged to start trying these, using them either for a meal replacement or a protein-rich snack. We reviewed the nutritional guidelines for selecting protein shakes. Pre-operative vitamin and mineral supplementation was reviewed. Patient was instructed to choose a chewable complete multivitamin with iron in NON-gummy form. Selection of probiotic was also reviewed. Patient's current diet habits include:  Patient is eating 3 meals and 2 snacks per day. Patient is not measuring portions out. Patient indicates they are eating out 1-2 times per week. This includes fast food, carry out, and sit down restaurants. Per dietary recall, pts diet has the following concerns: Pt is only consuming 48-50 oz of fluids/day, and reports consuming \"sweets\" 1-2 d/wk .  Pt has found 2 protein supplement shakes for use post-op in meeting their protein needs. Physical Activity/Exercise    Comments:  Patient is currently walking/chair aerobics for activity 25-30 min/d x 3 d/wk. We talked about recommended types of physical activity, including walking, swimming, cycling, or chair exercises. I also talked with patient about doing some strength training, which helps the metabolism, as well as strengthen muscles and bones. Patient's plan to incorporate more activity includes: \"I'm having surgery in a few days so exercise/walking will be more difficult.,  but want to further increase my water intake. I purchased a bottle that has goal markings I can meet each day. Behavior Modification     Comments:  During today's class, we discussed planning & prepping meals while on vacation as behavior change tool to improve eating behaviors, promote weight loss and long-term weight maintenance, encourage a healthy relationship with food, and prevent chronic disease. Patient was directed to the handout on \"Being Healthy on Vacation\", for guidelines to use while on vacation and as a reminder to limit or avoid Alcohol, keep splurges small, use protein drinks when needed for protein needs and to help stave off hunger while out and about. Additionally, discussed meal planning tips in relation to where the pt is staying and how they are travelling. Goals: Work to increase to 3-4 small meals per day, with 1-2 planned snacks as needed. Recommend following the plate method for meal planning - focusing on lean protein, non-starchy vegetables, and measured amounts of complex carbohydrates. - Goal of  g protein and <100 g carbohydrates per day. - Select at least 2 DIFFERENT protein supplements that can be used for protein supplementation to meet goals pre- and post-operatively. - Practice Carbohydrate Counting and label reading.   - Follow 3 g rule for fat and sugar.   2. Slow down meals  - Chew each bite 25-35 times.  - Aim for 20-30 min/meal.  3. Increase non-caloric fluids to 64 oz per day. Eliminate caffeine, added sugar, carbonation, and straws.               - Continue to work to decrease sugar sweetened beverages - goal of calorie-free beverages only. - Must eliminate caffeine prior to surgery and avoid for ~6-8 weeks. - Practice 30:30 rule,  food and fluid intake. 4. Start activity regimen, work to increase ADLs. 5. Start Complete MVI and probiotic at least 30 days pre-op. Candidate for surgery (per RD): PENDING, Pt scheduled for nutrition education on 8/25/2022.

## 2022-08-25 ENCOUNTER — HOSPITAL ENCOUNTER (OUTPATIENT)
Dept: BARIATRICS/WEIGHT MGMT | Age: 57
Discharge: HOME | End: 2022-08-25

## 2022-08-25 VITALS — HEIGHT: 66 IN | WEIGHT: 268.4 LBS | BODY MASS INDEX: 43.13 KG/M2

## 2022-08-25 NOTE — PROGRESS NOTES
Medical Weight Loss Multi-Disciplinary Program    Patient's Name: Jenniffer Rascon Age: 49 y.o. YOB: 1965 Sex: female     Session #3. Pt attended in-person class. Weight obtained in office. Date: 8/25/2022    Visit Vitals  Ht 5' 6\" (1.676 m)   Wt 121.7 kg (268 lb 6.4 oz)   BMI 43.32 kg/m²       Pounds Lost since last month: 6.9 lbs Pounds Gained since last month: 0.0 lbs       Starting Weight: 286 lbs Previous Months Weight: 275.3 lbs   Overall Pounds Lost: 17.6 lbs  Overall Pounds Gained: 0.0 lbs      Note that pt has a pre-operative weight loss goal of 10 lbs. Pt has not met this goal.    Do you smoke? no    Alcohol intake:  Number of drinks per week: 0    Class Guidelines    Guidelines are reviewed with patient at the start of every class. 1. Patient understands that weight loss trial classes must be consecutive. Patient understands if they miss a class, it is their responsibility to contact me to reschedule class. I will reach out to patient after their first no show. 2.  Patient understands the expectations that weight maintenance/weight loss is expected during the classes. Failure to demonstrate changes may result in extension of weight loss trial, followed by returning to see the surgeon. Patient understands that they CANNOT gain any weight during the weight loss trial.  Gaining weight will result in extension of weight loss trial.  3. Patient is also instructed to complete their labwork, psychological evaluation visit, and any other tests that the surgeon has ordered while they are working on their weight loss trial.  4.  Patient was instructed to bring their packet of nutrition education materials to every class and appointment. Other Pertinent Information    Changes Made Since Last Class: Pt states that she is no longer drinking alcohol or consuming candy. Patient's plan for dietary and/or behavior changes in the upcoming month: \"I cut out candy and alcohol.  (Huge Deal !!)\". Eating Habits and Behaviors      Today in class we reviewed the Key Diet Principles. Patient was encouraged to consume 3 meals each day, and meal timing was reviewed. Meal time behaviors that will help pt to be successful with their weight loss efforts were also discussed. We discussed the importance of drinking adequate amounts of fluids, recommending that patient consume a minimum of 64 oz of sugar-free, caffeine-free and carbonation-free fluids each day. Patient was encouraged to eliminate sugar-sweetened beverages such as sweet tea, fruit juice, fruit smoothies, flavored coffee drinks and regular sodas. During the weight loss trial, patient is encouraged to focus on eating protein-forward meals, with a daily goal of  grams protein. Patient was also advised to decrease carbohydrate intake to <100 g/d, choosing complex vs. simple carbohydrates in limited amounts. We also discussed limiting fat intake. Encouraged patient to follow the \"3-gram rule\" when choosing foods by looking for items containing <3 g of fat and sugar per serving. We reviewed meal planning guidelines and discussed appropriate meal and snack options. We also talked about appropriate protein drinks and patient was encouraged to start trying these, using them either for a meal replacement or a protein-rich snack pre-operatively. We reviewed the nutritional guidelines for selecting protein shakes. Pre-operative vitamin and mineral supplementation was reviewed. Patient was instructed to choose a chewable complete multivitamin with iron in NON-gummy form. Selection of probiotic was also reviewed. Patient's current diet habits include:  Patient is eating 3 meals and 3 snacks per day. Patient is measuring portions out. Patient indicates they are eating out 0 times per week. This includes fast food, carry out, and sit down restaurants.   Per dietary recall, pts diet has the following concerns: pt states that she is only drinking 45 oz of water/day. Pt has found 2 protein supplement shakes for use post-op in meeting their protein needs. Physical Activity/Exercise    Comments:  Patient is currently doing chair aerobics and chair yoga \"Gospel style\" for activity 25-30 min/d x 4-5 d/wk. We talked about recommended types of physical activity, including walking, swimming, cycling, or chair exercises. I also talked with patient about doing some strength training, which helps the metabolism, as well as strengthen muscles and bones. Patient's plan to incorporate more activity includes: NONE NOTED. Behavior Modification     Comments:  During today's class, we discussed the benefits of regular physical activity. Specific guidelines for cardiovascular exercise and resistance/strength training were reviewed, as well as appropriate types of physical activity. Patient was directed to the handout, \"Overcoming Barriers to Exercise\", where we reviewed the importance of making physical activity part of a healthy lifestyle rather than just a way of meeting a weight loss goal.  We also discussed  for specific tips on fitting physical activity in when patients feel they are too busy to exercise and during inclement weather, as well as ideas for low/no cost exercise, and ways to exercise despite physical limitations. Patient was instructed to discuss any physical limitations with their healthcare provider. A list of ways to facilitate regular physical activity was reviewed, as well as how to get started with a regular physical activity regimen. Goals: Work to increase to 3-4 small meals per day, with 1-2 planned snacks as needed. Recommend following the plate method for meal planning - focusing on lean protein, non-starchy vegetables, and measured amounts of complex carbohydrates. - Goal of  g protein and <100 g carbohydrates per day.                - Select at least 2 DIFFERENT protein supplements that can be used for protein supplementation to meet goals pre- and post-operatively. - Practice Carbohydrate Counting and label reading.   - Follow 3 g rule for fat and sugar. 2. Slow down meals  - Chew each bite 25-35 times. - Aim for 20-30 min/meal.  3. Increase non-caloric fluids to 64 oz per day. Eliminate caffeine, added sugar, carbonation, and straws.               - Continue to work to decrease sugar sweetened beverages - goal of calorie-free beverages only. - Must eliminate caffeine prior to surgery and avoid for ~6-8 weeks. - Practice 30:30 rule,  food and fluid intake. 4. Start activity regimen, work to increase ADLs. 5. Start Complete MVI and probiotic at least 30 days pre-op. Candidate for surgery (per RD): YES - Pt acknowledges understanding that bariatric surgery requires lifelong adherence to dietary and exercise behavior change recommendations in order to be successful with weight loss and weight-loss maintenance. Pt demonstrates understanding of post-op key diet principles and recommendations. Pt passed bariatric quiz with at least 80% pass rate. Pt does not have any questions/concerns at this time. Pt is engaging in 4-5 days/week of physical activity. Pt does not have any questions/concerns at this time. RD contact information provided to pt for any future nutrition-related questions or concerns. Patient is aware that they will be attending pre-op class 2 weeks before surgery and will get more detailed information on the post-op diet guidelines. Patient will have post-op telephone nutrition appointment at 2 weeks post-op. At 2 weeks post-op appt, RD will assess patient's liquid diet tolerance and readiness to advance to soft/pureed diet. Pt will also have a follow-up telephone nutrition appointment at 1 mo. post op.   At 1-month post-op visit, RD will assess how patient is tolerating soft/puree protein and advance to add vegetables, if tolerating soft protein without difficulty. At this appointment, RD will also add vitamins, per protocol, to pts current vitamin and mineral regimen. RD will assess patient's compliance with current protocol, including diet, vitamins, protein shakes, and exercise. Post-op diet guidelines will be reinforced. RD is available for questions and to meet with patient outside of the 2 week and 1-month post-op visits.

## 2022-09-13 DIAGNOSIS — E66.01 MORBID OBESITY WITH BMI OF 45.0-49.9, ADULT (HCC): ICD-10-CM

## 2022-09-13 DIAGNOSIS — Z01.812 BLOOD TESTS PRIOR TO TREATMENT OR PROCEDURE: ICD-10-CM

## 2022-09-13 DIAGNOSIS — E66.01 MORBID OBESITY (HCC): Primary | ICD-10-CM

## 2022-11-09 ENCOUNTER — TELEPHONE (OUTPATIENT)
Dept: SURGERY | Age: 57
End: 2022-11-09

## 2022-11-09 NOTE — TELEPHONE ENCOUNTER
Patient was contacted to discuss some important pre-op information and education in preparation for surgery.        LVM/am

## 2022-11-11 ENCOUNTER — TELEPHONE (OUTPATIENT)
Dept: SURGERY | Age: 57
End: 2022-11-11

## 2022-11-11 NOTE — TELEPHONE ENCOUNTER
Patient was contacted to discuss some important pre-op information and education in preparation for surgery. Diabetes:  Have you been testing your blood sugar levels at home, and what have they been the past few days? Not diabetic     If they are not significantly lower than 200 the entire month prior to surgery, you need to let us know and contact your family physician for an appointment. Hypertension:  Are you monitoring your blood pressure at home, and what has it been? No hypertension     If your blood pressure has not been well-controlled with a systolic pressure lower than  351 and a diastolic pressure lower than 100, you need to contact your family physician for an appointment. Steroids (oral and/or injectable):  Have you recently taken any oral steroids such as Prednisone or a Medrol dose pack for a respiratory infection or COVID? No     Have you had any steroid injections such as a cortisone injection in your back, knee, foot, or any other part of your body? No    You can not have any oral steroids and/or steroid injections 45 days prior to surgery. NSAIDS:  Have you recently taken any NSAIDS such as Mobic, Aleve, Naprosyn, Motrin, BC or Goody's powder? No and aware and not to use any     NSAIDS must be stopped 14 days prior to surgery. If you are currently taking aspirin or Plavix that a physician has prescribed, continue to take it as prescribed, and a provider in our office will advise you during your pre-op appointment. Birth Control:  Are you currently taking any form of birth control? If so, are you taking the Depo Provera injection or oral birth control pills? No      You need to stop taking it two weeks prior to surgery and can start back two weeks after surgery. You will need to use another form of protection during this time, such as condoms to avoid pregancy. If you have an IUD or implant as a form of birth control, it is okay, and you may continue.     Medication:  Have you had any significant changes to any medication? Pt will let us know

## 2022-11-18 ENCOUNTER — HOSPITAL ENCOUNTER (OUTPATIENT)
Dept: LAB | Age: 57
Discharge: HOME | End: 2022-11-18
Payer: COMMERCIAL

## 2022-11-18 DIAGNOSIS — Z01.812 BLOOD TESTS PRIOR TO TREATMENT OR PROCEDURE: ICD-10-CM

## 2022-11-18 DIAGNOSIS — E66.01 MORBID OBESITY (HCC): ICD-10-CM

## 2022-11-18 DIAGNOSIS — E66.01 MORBID OBESITY WITH BMI OF 45.0-49.9, ADULT (HCC): ICD-10-CM

## 2022-11-18 LAB
ALBUMIN SERPL-MCNC: 3.6 G/DL (ref 3.4–5)
ALBUMIN/GLOB SERPL: 1 {RATIO} (ref 0.8–1.7)
ALP SERPL-CCNC: 84 U/L (ref 45–117)
ALT SERPL-CCNC: 18 U/L (ref 13–56)
ANION GAP SERPL CALC-SCNC: 5 MMOL/L (ref 3–18)
AST SERPL-CCNC: 13 U/L (ref 10–38)
BASOPHILS # BLD: 0 K/UL (ref 0–0.1)
BASOPHILS NFR BLD: 1 % (ref 0–2)
BILIRUB SERPL-MCNC: 0.4 MG/DL (ref 0.2–1)
BUN SERPL-MCNC: 14 MG/DL (ref 7–18)
BUN/CREAT SERPL: 16 (ref 12–20)
CALCIUM SERPL-MCNC: 9.3 MG/DL (ref 8.5–10.1)
CHLORIDE SERPL-SCNC: 111 MMOL/L (ref 100–111)
CO2 SERPL-SCNC: 26 MMOL/L (ref 21–32)
CREAT SERPL-MCNC: 0.86 MG/DL (ref 0.6–1.3)
DIFFERENTIAL METHOD BLD: ABNORMAL
EOSINOPHIL # BLD: 0.1 K/UL (ref 0–0.4)
EOSINOPHIL NFR BLD: 2 % (ref 0–5)
ERYTHROCYTE [DISTWIDTH] IN BLOOD BY AUTOMATED COUNT: 15.6 % (ref 11.6–14.5)
GLOBULIN SER CALC-MCNC: 3.5 G/DL (ref 2–4)
GLUCOSE SERPL-MCNC: 91 MG/DL (ref 74–99)
HCG SERPL QL: NEGATIVE
HCT VFR BLD AUTO: 36.8 % (ref 35–45)
HGB BLD-MCNC: 11.6 G/DL (ref 12–16)
IMM GRANULOCYTES # BLD AUTO: 0 K/UL (ref 0–0.04)
IMM GRANULOCYTES NFR BLD AUTO: 0 % (ref 0–0.5)
LYMPHOCYTES # BLD: 2.2 K/UL (ref 0.9–3.6)
LYMPHOCYTES NFR BLD: 36 % (ref 21–52)
MCH RBC QN AUTO: 25.6 PG (ref 24–34)
MCHC RBC AUTO-ENTMCNC: 31.5 G/DL (ref 31–37)
MCV RBC AUTO: 81.2 FL (ref 78–100)
MONOCYTES # BLD: 0.5 K/UL (ref 0.05–1.2)
MONOCYTES NFR BLD: 8 % (ref 3–10)
NEUTS SEG # BLD: 3.3 K/UL (ref 1.8–8)
NEUTS SEG NFR BLD: 53 % (ref 40–73)
NRBC # BLD: 0 K/UL (ref 0–0.01)
NRBC BLD-RTO: 0 PER 100 WBC
PLATELET # BLD AUTO: 191 K/UL (ref 135–420)
PMV BLD AUTO: 12.3 FL (ref 9.2–11.8)
POTASSIUM SERPL-SCNC: 4.2 MMOL/L (ref 3.5–5.5)
PROT SERPL-MCNC: 7.1 G/DL (ref 6.4–8.2)
RBC # BLD AUTO: 4.53 M/UL (ref 4.2–5.3)
SODIUM SERPL-SCNC: 142 MMOL/L (ref 136–145)
WBC # BLD AUTO: 6.1 K/UL (ref 4.6–13.2)

## 2022-11-18 PROCEDURE — 80053 COMPREHEN METABOLIC PANEL: CPT

## 2022-11-18 PROCEDURE — 84703 CHORIONIC GONADOTROPIN ASSAY: CPT

## 2022-11-18 PROCEDURE — 85025 COMPLETE CBC W/AUTO DIFF WBC: CPT

## 2022-11-18 PROCEDURE — 36415 COLL VENOUS BLD VENIPUNCTURE: CPT

## 2022-11-28 ENCOUNTER — NURSE NAVIGATOR (OUTPATIENT)
Dept: SURGERY | Age: 57
End: 2022-11-28

## 2022-11-28 ENCOUNTER — HOSPITAL ENCOUNTER (OUTPATIENT)
Dept: PREADMISSION TESTING | Age: 57
Discharge: HOME | End: 2022-11-28
Payer: COMMERCIAL

## 2022-11-28 ENCOUNTER — OFFICE VISIT (OUTPATIENT)
Dept: SURGERY | Age: 57
End: 2022-11-28

## 2022-11-28 ENCOUNTER — HOSPITAL ENCOUNTER (OUTPATIENT)
Dept: BARIATRICS/WEIGHT MGMT | Age: 57
Discharge: HOME | End: 2022-11-28

## 2022-11-28 VITALS
WEIGHT: 272 LBS | DIASTOLIC BLOOD PRESSURE: 68 MMHG | SYSTOLIC BLOOD PRESSURE: 138 MMHG | OXYGEN SATURATION: 100 % | TEMPERATURE: 96.9 F | BODY MASS INDEX: 43.71 KG/M2 | HEART RATE: 67 BPM | HEIGHT: 66 IN

## 2022-11-28 DIAGNOSIS — E66.01 MORBID OBESITY (HCC): ICD-10-CM

## 2022-11-28 DIAGNOSIS — G89.29 CHRONIC MIDLINE LOW BACK PAIN WITH SCIATICA, SCIATICA LATERALITY UNSPECIFIED: ICD-10-CM

## 2022-11-28 DIAGNOSIS — M54.40 CHRONIC MIDLINE LOW BACK PAIN WITH SCIATICA, SCIATICA LATERALITY UNSPECIFIED: ICD-10-CM

## 2022-11-28 DIAGNOSIS — Z98.84 S/P LAPAROSCOPIC SLEEVE GASTRECTOMY: ICD-10-CM

## 2022-11-28 DIAGNOSIS — M79.672 LEFT FOOT PAIN: ICD-10-CM

## 2022-11-28 DIAGNOSIS — M17.0 ARTHRITIS OF BOTH KNEES: ICD-10-CM

## 2022-11-28 DIAGNOSIS — K21.9 GASTROESOPHAGEAL REFLUX DISEASE, UNSPECIFIED WHETHER ESOPHAGITIS PRESENT: ICD-10-CM

## 2022-11-28 DIAGNOSIS — J45.909 UNCOMPLICATED ASTHMA, UNSPECIFIED ASTHMA SEVERITY, UNSPECIFIED WHETHER PERSISTENT: Primary | ICD-10-CM

## 2022-11-28 DIAGNOSIS — E66.01 MORBID OBESITY WITH BMI OF 45.0-49.9, ADULT (HCC): ICD-10-CM

## 2022-11-28 DIAGNOSIS — M19.90 OSTEOARTHRITIS, UNSPECIFIED OSTEOARTHRITIS TYPE, UNSPECIFIED SITE: ICD-10-CM

## 2022-11-28 DIAGNOSIS — D64.9 ANEMIA, UNSPECIFIED TYPE: ICD-10-CM

## 2022-11-28 DIAGNOSIS — K90.9 INTESTINAL MALABSORPTION, UNSPECIFIED TYPE: ICD-10-CM

## 2022-11-28 PROCEDURE — 93005 ELECTROCARDIOGRAM TRACING: CPT

## 2022-11-28 RX ORDER — OMEPRAZOLE 20 MG/1
20 CAPSULE, DELAYED RELEASE ORAL DAILY
Qty: 30 CAPSULE | Refills: 1 | Status: SHIPPED | OUTPATIENT
Start: 2022-11-28 | End: 2022-12-01

## 2022-11-28 RX ORDER — ENOXAPARIN SODIUM 100 MG/ML
40 INJECTION SUBCUTANEOUS EVERY 12 HOURS
Qty: 20 EACH | Refills: 0 | Status: SHIPPED | OUTPATIENT
Start: 2022-11-28 | End: 2022-12-08

## 2022-11-28 RX ORDER — ONDANSETRON 8 MG/1
8 TABLET, ORALLY DISINTEGRATING ORAL
Qty: 30 TABLET | Refills: 0 | Status: SHIPPED | OUTPATIENT
Start: 2022-11-28

## 2022-11-28 NOTE — PROGRESS NOTES
Sleeve to Gastric Bypass Conversion - History and Physical    Subjective: The patient is a 56 y.o. obese female with a Body mass index is 43.9 kg/m². .   she presents now to review their work up to date to see if they are a candidate for surgery and whether or not to proceed with the previously requested procedure. She had a sleeve resection via Adonica Juan Alberto 2.5 years ago with poor weight loss and GERD and evidence of a dilated gastric pouch. Bariatric comorbidities continue to include:   Patient Active Problem List   Diagnosis Code    Chronic back pain M54.9, G89.29    Morbid obesity ([facility]) E66.01    Arthritis of both knees M17.0    Asthma J45.909    Left foot pain M79.672    Intestinal malabsorption K90.9    S/P laparoscopic sleeve gastrectomy Z98.84    Vitamin D deficiency E55.9    Morbid obesity with BMI of 45.0-49.9, adult ([facility]) E66.01, Z68.42    Anemia D64.9    DJD (degenerative joint disease) M19.90    GERD (gastroesophageal reflux disease) K21.9       They have been generally well prior to this visit and have had no recent significant illnesses. The patient has had no gastrointestinal issues that would preclude them from proceeding with the surgery they have chosen. Faustina Mortimer has recently tried a preoperative weight loss program  in addition to seeing a bariatric nutritionist preoperatively. We have discussed on at least one other occasion about the various types of surgical weight loss procedures and they have considered these options after our initial consultation. We have once again discussed these procedures in detail and they have now decided on a surgical procedure. They present today to discuss this and confirm that their evaluation pre operatively is acceptable to continue with surgery. The patient desires laparoscopic gastric bypass surgery for surgical weight loss. The patients goal weight is 167 lb.      These goals are consistent with expected outcomes of their desired operation. her Medical goals are resolution of these health issues. Patient Active Problem List    Diagnosis Date Noted    GERD (gastroesophageal reflux disease) 11/28/2022    Intestinal malabsorption 06/06/2022    S/P laparoscopic sleeve gastrectomy 06/06/2022    Left foot pain     Vitamin D deficiency     Morbid obesity with BMI of 45.0-49.9, adult ([facility])     Anemia     DJD (degenerative joint disease)     Chronic back pain     Morbid obesity ([facility])     Arthritis of both knees     Asthma       Past Surgical History:   Procedure Laterality Date    HX HERNIA REPAIR      bilat inguinal    HX ORTHOPAEDIC      foot surgeries    VT LAP, ULISES RESTRICT PROC, LONGITUDINAL GASTRECTOMY  07/27/2020    Dr Lara Last      Social History     Tobacco Use    Smoking status: Never    Smokeless tobacco: Never   Substance Use Topics    Alcohol use: Yes     Comment: social/ occ      Family History   Adopted: Yes      Current Outpatient Medications   Medication Sig Dispense Refill    ondansetron (ZOFRAN ODT) 8 mg disintegrating tablet Take 1 Tablet by mouth every eight (8) hours as needed for Nausea or Vomiting. 30 Tablet 0    omeprazole (PRILOSEC) 20 mg capsule Take 1 Capsule by mouth daily. 30 Capsule 1    enoxaparin (LOVENOX) 40 mg/0.4 mL 0.4 mL by SubCUTAneous route every twelve (12) hours for 10 days.  Indications: deep vein thrombosis prevention 20 Each 0    Cyclobenzaprine 7.5 mg tablet cyclobenzaprine 7.5 mg tablet   TAKE 1 TABLET BY MOUTH EVERYDAY AT BEDTIME      diclofenac (VOLTAREN) 1 % gel APPLY 1 GRAM TO THE AFFFECTED AREA(S) TWICE A DAY FOR 30 DAYS EXTERNAL TWICE A DAY 30 DAYS      ergocalciferol (ERGOCALCIFEROL) 1,250 mcg (50,000 unit) capsule ergocalciferol (vitamin D2) 1,250 mcg (50,000 unit) capsule   TAKE 1 CAP BY MOUTH ONCE A WEEK      naproxen (NAPROSYN) 500 mg tablet TAKE 1 TABLET WITH FOOD OR MILK AS NEEDED ORALLY EVERY 12 HOURS FOR 15 DAYS       Allergies   Allergen Reactions    Penicillin G Hives     Swelling Penicillins Hives and Itching    Stings [Sting, Bee] Hives     Swelling ,shortness of breath          Review of Systems:        General - No history or complaints of unexpected fever, chills, or weight loss  Head/Neck - No history or complaints of headache, diplopia, dysphagia, hearing loss  Cardiac - No history or complaints of chest pain, palpitations, murmur, or shortness of breath  Pulmonary - No history or complaints of shortness of breath, productive cough, hemoptysis  Gastrointestinal - mild reflux,no  abdominal pain, obstipation/constipation or blood per rectum  Genitourinary - No history or complaints of hematuria/dysuria, stress urinary incontinence symptoms, or renal lithiasis  Musculoskeletal - mild joint pain in their knees,  no muscular weakness  Hematologic - No history or complaints of bleeding disorders,  No blood transfusions  Neurologic - No history or complaints of  migraine headaches, seizure activity, syncopal episodes, TIA or stroke  Integumentary - No history or complaints of rashes, abnormal nevi, skin cancer  Gynecological - unremarkable     Objective:   Visit Vitals  /68 (BP 1 Location: Left upper arm, BP Patient Position: Sitting, BP Cuff Size: Large adult)   Pulse 67   Temp 96.9 °F (36.1 °C)   Ht 5' 6\" (1.676 m)   Wt 123.4 kg (272 lb)   SpO2 100%   BMI 43.90 kg/m²       Physical Examination: General appearance - alert, well appearing, and in no distress  Mental status - alert, oriented to person, place, and time  Eyes - pupils equal and reactive, extraocular eye movements intact  Ears - external ear canals normal  Nose - normal and patent, no erythema, discharge or polyps  Mouth - mucous membranes moist, pharynx normal without lesions  Neck - supple, no significant adenopathy  Lymphatics - no palpable lymphadenopathy, no hepatosplenomegaly  Chest - clear to auscultation, no wheezes, rales or rhonchi, symmetric air entry  Heart - normal rate, regular rhythm, normal S1, S2, no murmurs, rubs, clicks or gallops  Abdomen - soft, nontender, nondistended, no masses or organomegaly  Back exam - full range of motion, no tenderness, palpable spasm or pain on motion  Neurological - alert, oriented, normal speech, no focal findings or movement disorder noted  Musculoskeletal - no joint tenderness, deformity or swelling  Extremities - peripheral pulses normal, no pedal edema, no clubbing or cyanosis  Skin - normal coloration and turgor, no rashes, no suspicious skin lesions noted    Labs :     Lab Results   Component Value Date/Time    WBC 6.1 11/18/2022 02:34 PM    HGB 11.6 (L) 11/18/2022 02:34 PM    HCT 36.8 11/18/2022 02:34 PM    PLATELET 367 24/32/9483 02:34 PM    MCV 81.2 11/18/2022 02:34 PM     Lab Results   Component Value Date/Time    Sodium 142 11/18/2022 02:34 PM    Potassium 4.2 11/18/2022 02:34 PM    Chloride 111 11/18/2022 02:34 PM    CO2 26 11/18/2022 02:34 PM    Anion gap 5 11/18/2022 02:34 PM    Glucose 91 11/18/2022 02:34 PM    BUN 14 11/18/2022 02:34 PM    Creatinine 0.86 11/18/2022 02:34 PM    BUN/Creatinine ratio 16 11/18/2022 02:34 PM    GFR est AA >60 06/22/2022 02:38 PM    GFR est non-AA >60 06/22/2022 02:38 PM    Calcium 9.3 11/18/2022 02:34 PM    Bilirubin, total 0.4 11/18/2022 02:34 PM    Alk.  phosphatase 84 11/18/2022 02:34 PM    Protein, total 7.1 11/18/2022 02:34 PM    Albumin 3.6 11/18/2022 02:34 PM    Globulin 3.5 11/18/2022 02:34 PM    A-G Ratio 1.0 11/18/2022 02:34 PM    ALT (SGPT) 18 11/18/2022 02:34 PM     Lab Results   Component Value Date/Time    Iron 41 (L) 06/22/2022 02:38 PM    Ferritin 22 06/22/2022 02:38 PM     Lab Results   Component Value Date/Time    Folate 16.0 06/22/2022 02:38 PM     Lab Results   Component Value Date/Time    Vitamin D 25-Hydroxy 61.3 06/22/2022 02:38 PM               Cardiac / Pulmonary Evaluation:     NA    UGI Results:     Abnormal UGI showing retained pre-pyloric stomach    Assessment:     Morbid obesity with associated comorbidity    Plan:     laparoscopic gastric bypass surgery    This is a 56 y.o. female with a BMI of Body mass index is 43.9 kg/m². and the weight-related co-morbidties as noted above. Ami Mora meets the NIH criteria for bariatric surgery based upon the BMI of Body mass index is 43.9 kg/m². and   multiple weight-related co-morbidties. Ami Mora has elected laparoscopic gastric bypass as her intervention of choice for treatment of morbid obestiy through surgical means secondary to its   uniform results,  profound baseline suppression of hunger and pace at which weight is lost.    In the office today, following Paul's history and physical examination, a 40 minute discussion regarding the anatomic alterations for the laparoscopic gastric bypass  was undertaken. The dietary   expectations and the patient  dependent factors for success were thoroughly discussed, to include the need for interval follow-up and long-term dietary changes associated with success. The possible short   and long term  complications of the gastric bypass were also discussed, to include but not limited to;death, DVT/PE, staple line leak, bleeding, stricture formation, infection,internal hernia  and pouch dilation. Specific weight related outcomes for success were also discussed with an emphasis on careful and close follow-up with the first year and Dietary behavior modification over the first years as baseline cyclical   hunger returns  The patient expressed an understanding of the above factors, and her questions were answered in their entirety. In addition, the patient attended a 1.5 hour power point seminar or online seminar regarding obesity, surgical weight loss including, adjustable gastric band, gastric bypass, and sleeve gastrectomy. This discussion contrasted   the different surgical techniques, mechanisms of actions and expected outcomes, and surgical and medical risks associated with each procedure.   During the in office seminar, there was a long question and answer   session where each questions was answered until there were no additional questions. Today, the patient had all of her questions answered and the decision was made today that the patient's preoperative evaluation is acceptable for them  to proceed with bariatric surgery  choosing  gastric bypass as her surgical option. She understands the above mentioned risks are elevated in a conversion procedure. She also understands there is no guarantee we will be able to proceed with the conversion until her anatomy is evaluated at the time of surgery. Secondary Diagnoses:     GERD -The patient understands that weight loss surgery is not a guaranteed cure for reflux disease but does understand the benefits that weight loss can have on reflux disease. They also understand that at the time of surgery the gastroesophageal junction will be evaluated for the presence of a diaphragmatic hernia. Hernias will be corrected always with the surgical procedure if possible and is deemed safe. The patient also understands that neither weight loss surgery nor repair of a diaphragmatic hernia repair guarantees the complete cessation of the disease. They also understand there is a possibility of recurrence of these hernias with a simple crural repair as is performed with these procedures. They understand they may have to continue their medications in the postoperative period. They have a good understanding that the gastric bypass procedure is better suited to total resolution of this issue and that neither the Lap Band or sleeve gastrectomy is considered a curative procedure as it pertains to this diagnosis. Weight Related Arthritis -The patient understands the benefits that weight loss surgery can have on their arthritis but also understands that weight loss is not a guaranteed cure and relief of symptoms is often dependent on the severity of the underlying disease.   The patient also understands that traditional pharmaceutical treatments for this diagnosis are usually unavailable to post-operative weight loss patients due to the effects on the gastrointestinal tract particularly with the gastric bypass and to a lesser effect with the sleeve gastrectomy. Any changes to the patients medication treatment will ultimately be made the patients PCP with input by our office. Restrictive Airway Disease - We will continue all of their pulmonary medications in the form of oral pills and inhalers in both the perioperative and postoperative period. They understand that their symptoms should improve with weight loss. Any further testing related to this will be turned over to their family physician or pulmonologist. The patient  understands that if they require oral or IV steroids in the future that they will notify us. This is particularly important for gastric bypass patients at all times and both sleeve  gastrectomy and gastric bypass patients in the 1 month pre op and 1 month post operative period. They understand that inhaled steroids are exempt from this. Signed By: Elbert Finney MD     November 28, 2022           I spent a total of  50 minutes providing this service to the patient today to include discussing their surgical choice, reviewing their work-up to date, and performing a full history and physical examination.

## 2022-11-28 NOTE — PROGRESS NOTES
CLINICAL NUTRITION PRE-OPERATIVE EDUCATION    Patient's Name: Faiza Brunner   Age: 56 y.o. YOB: 1965   Sex: female    Education & Materials Provided: Post-op Binder to include:  Liquid Diet Shopping List   Supplemental Resource Guide: MVI, B12, Calcium Citrate, Vitamin D, Vitamin B50, and iron recommendations  Protein Supplement Information   Fluid Requirements/ No Straws  No Caffeine or Carbonation   No alcohol              No Snacks or No Concentrated Sweets     Exercising   Key Diet Principles            Addressed Current Habits/Changes to Make   Patient was instructed on clear liquid diet to follow for one (1) day prior to surgery. Patient has been educated on the liquid diet to begin day 2 post-op and continue for 2 weeks post surgery. Patient understands the transition of the diet and need to remain on full liquid diet until advanced by provider and dietitian. Summary:  Patient has completed the required amount of visits with the Registered Dietitian. During these nutrition visits, we focused on dietary changes, behavior changes, and the importance of establishing an exercise routine. The diet protocol that patient was prescribed emphasized on low carbohydrates (less than 50 grams per day prior to surgery), and 60-80 grams of protein per day. At today's session, patient was educated on the post-op diet and vitamin/mineral protocols. Patient understands the importance of keeping total fat and sugar intake to less than 3 g per serving. Patient is aware of the timeline for the different stages of the post-op diet and is aware that they will be on a modified full liquid diet for 2 weeks post-op. Patient understands that the body needs ~ g/d protein after surgery. During the liquid diet phase, patient will need a minimum of 60 g/d protein from supplemental shakes.   Once eating soft protein-rich foods, patient will need 40-60 g/d protein from supplemental shakes to meet the total daily intake goal of  g/d protein. Vitamin supplementation was reviewed, and pt was encouraged to continue with BID children's chewable complete multivitamin with iron and every day probiotic supplementation until their 1 month call with RD, when they will add in the additional recommended vitamin & mineral supplements. Patient understands the importance of being compliant with the diet and vitamin/mineral protocols, as well as the complications and risks that can occur if they are non-compliant. Patient has also been educated on the pre-op clear liquid diet protocol for 1 day prior to surgery. Patient understands that failure to comply with following the pre-op clear liquid diet could result in surgery being canceled. Patient's 2 week post-op nutrition virtual appointment has been scheduled. If patient is tolerating liquid diet without difficulty, RD will recommend advancement of patient's diet to soft/moist (puree) diet to provider. Patient will also have a virtual appointment with RD again at 1 month post-op to assess tolerance of soft/moist (puree) diet and advance to soft protein with soft vegetables, if soft/moist (puree) diet is tolerated without difficulty. RD will assess patient's compliance with current protocol, including diet, vitamins/minerals, protein shakes, and physical activity. Post-op diet guidelines will be reinforced. Patient provided with RD contact information, and RD is available for questions and to meet with patient outside of the 2 week and 1 month post-op visit prn. RD contact information in pre-op binder was reviewed in class. All current patient stated questions answered.     Larry Lacey Ram[address], [address] [place] [address]  11/28/2022

## 2022-11-28 NOTE — H&P (VIEW-ONLY)
Sleeve to Gastric Bypass Conversion - History and Physical    Subjective: The patient is a 56 y.o. obese female with a Body mass index is 43.9 kg/m². .   she presents now to review their work up to date to see if they are a candidate for surgery and whether or not to proceed with the previously requested procedure. She had a sleeve resection via Levell Meghan 2.5 years ago with poor weight loss and GERD and evidence of a dilated gastric pouch. Bariatric comorbidities continue to include:   Patient Active Problem List   Diagnosis Code    Chronic back pain M54.9, G89.29    Morbid obesity ([facility]) E66.01    Arthritis of both knees M17.0    Asthma J45.909    Left foot pain M79.672    Intestinal malabsorption K90.9    S/P laparoscopic sleeve gastrectomy Z98.84    Vitamin D deficiency E55.9    Morbid obesity with BMI of 45.0-49.9, adult (HCC) E66.01, Z68.42    Anemia D64.9    DJD (degenerative joint disease) M19.90    GERD (gastroesophageal reflux disease) K21.9       They have been generally well prior to this visit and have had no recent significant illnesses. The patient has had no gastrointestinal issues that would preclude them from proceeding with the surgery they have chosen. Ajit Ballesteros has recently tried a preoperative weight loss program  in addition to seeing a bariatric nutritionist preoperatively. We have discussed on at least one other occasion about the various types of surgical weight loss procedures and they have considered these options after our initial consultation. We have once again discussed these procedures in detail and they have now decided on a surgical procedure. They present today to discuss this and confirm that their evaluation pre operatively is acceptable to continue with surgery. The patient desires laparoscopic gastric bypass surgery for surgical weight loss. The patients goal weight is 167 lb.      These goals are consistent with expected outcomes of their desired operation. her Medical goals are resolution of these health issues. Patient Active Problem List    Diagnosis Date Noted    GERD (gastroesophageal reflux disease) 11/28/2022    Intestinal malabsorption 06/06/2022    S/P laparoscopic sleeve gastrectomy 06/06/2022    Left foot pain     Vitamin D deficiency     Morbid obesity with BMI of 45.0-49.9, adult (HCC)     Anemia     DJD (degenerative joint disease)     Chronic back pain     Morbid obesity (HCC)     Arthritis of both knees     Asthma       Past Surgical History:   Procedure Laterality Date    HX HERNIA REPAIR      bilat inguinal    HX ORTHOPAEDIC      foot surgeries    RI LAP, ULISES RESTRICT PROC, LONGITUDINAL GASTRECTOMY  07/27/2020    Dr Mona Nye      Social History     Tobacco Use    Smoking status: Never    Smokeless tobacco: Never   Substance Use Topics    Alcohol use: Yes     Comment: social/ occ      Family History   Adopted: Yes      Current Outpatient Medications   Medication Sig Dispense Refill    ondansetron (ZOFRAN ODT) 8 mg disintegrating tablet Take 1 Tablet by mouth every eight (8) hours as needed for Nausea or Vomiting. 30 Tablet 0    omeprazole (PRILOSEC) 20 mg capsule Take 1 Capsule by mouth daily. 30 Capsule 1    enoxaparin (LOVENOX) 40 mg/0.4 mL 0.4 mL by SubCUTAneous route every twelve (12) hours for 10 days.  Indications: deep vein thrombosis prevention 20 Each 0    Cyclobenzaprine 7.5 mg tablet cyclobenzaprine 7.5 mg tablet   TAKE 1 TABLET BY MOUTH EVERYDAY AT BEDTIME      diclofenac (VOLTAREN) 1 % gel APPLY 1 GRAM TO THE AFFFECTED AREA(S) TWICE A DAY FOR 30 DAYS EXTERNAL TWICE A DAY 30 DAYS      ergocalciferol (ERGOCALCIFEROL) 1,250 mcg (50,000 unit) capsule ergocalciferol (vitamin D2) 1,250 mcg (50,000 unit) capsule   TAKE 1 CAP BY MOUTH ONCE A WEEK      naproxen (NAPROSYN) 500 mg tablet TAKE 1 TABLET WITH FOOD OR MILK AS NEEDED ORALLY EVERY 12 HOURS FOR 15 DAYS       Allergies   Allergen Reactions    Penicillin G Hives     Swelling Penicillins Hives and Itching    Stings [Sting, Bee] Hives     Swelling ,shortness of breath          Review of Systems:        General - No history or complaints of unexpected fever, chills, or weight loss  Head/Neck - No history or complaints of headache, diplopia, dysphagia, hearing loss  Cardiac - No history or complaints of chest pain, palpitations, murmur, or shortness of breath  Pulmonary - No history or complaints of shortness of breath, productive cough, hemoptysis  Gastrointestinal - mild reflux,no  abdominal pain, obstipation/constipation or blood per rectum  Genitourinary - No history or complaints of hematuria/dysuria, stress urinary incontinence symptoms, or renal lithiasis  Musculoskeletal - mild joint pain in their knees,  no muscular weakness  Hematologic - No history or complaints of bleeding disorders,  No blood transfusions  Neurologic - No history or complaints of  migraine headaches, seizure activity, syncopal episodes, TIA or stroke  Integumentary - No history or complaints of rashes, abnormal nevi, skin cancer  Gynecological - unremarkable     Objective:   Visit Vitals  /68 (BP 1 Location: Left upper arm, BP Patient Position: Sitting, BP Cuff Size: Large adult)   Pulse 67   Temp 96.9 °F (36.1 °C)   Ht 5' 6\" (1.676 m)   Wt 123.4 kg (272 lb)   SpO2 100%   BMI 43.90 kg/m²       Physical Examination: General appearance - alert, well appearing, and in no distress  Mental status - alert, oriented to person, place, and time  Eyes - pupils equal and reactive, extraocular eye movements intact  Ears - external ear canals normal  Nose - normal and patent, no erythema, discharge or polyps  Mouth - mucous membranes moist, pharynx normal without lesions  Neck - supple, no significant adenopathy  Lymphatics - no palpable lymphadenopathy, no hepatosplenomegaly  Chest - clear to auscultation, no wheezes, rales or rhonchi, symmetric air entry  Heart - normal rate, regular rhythm, normal S1, S2, no murmurs, rubs, clicks or gallops  Abdomen - soft, nontender, nondistended, no masses or organomegaly  Back exam - full range of motion, no tenderness, palpable spasm or pain on motion  Neurological - alert, oriented, normal speech, no focal findings or movement disorder noted  Musculoskeletal - no joint tenderness, deformity or swelling  Extremities - peripheral pulses normal, no pedal edema, no clubbing or cyanosis  Skin - normal coloration and turgor, no rashes, no suspicious skin lesions noted    Labs :     Lab Results   Component Value Date/Time    WBC 6.1 11/18/2022 02:34 PM    HGB 11.6 (L) 11/18/2022 02:34 PM    HCT 36.8 11/18/2022 02:34 PM    PLATELET 240 15/92/4887 02:34 PM    MCV 81.2 11/18/2022 02:34 PM     Lab Results   Component Value Date/Time    Sodium 142 11/18/2022 02:34 PM    Potassium 4.2 11/18/2022 02:34 PM    Chloride 111 11/18/2022 02:34 PM    CO2 26 11/18/2022 02:34 PM    Anion gap 5 11/18/2022 02:34 PM    Glucose 91 11/18/2022 02:34 PM    BUN 14 11/18/2022 02:34 PM    Creatinine 0.86 11/18/2022 02:34 PM    BUN/Creatinine ratio 16 11/18/2022 02:34 PM    GFR est AA >60 06/22/2022 02:38 PM    GFR est non-AA >60 06/22/2022 02:38 PM    Calcium 9.3 11/18/2022 02:34 PM    Bilirubin, total 0.4 11/18/2022 02:34 PM    Alk.  phosphatase 84 11/18/2022 02:34 PM    Protein, total 7.1 11/18/2022 02:34 PM    Albumin 3.6 11/18/2022 02:34 PM    Globulin 3.5 11/18/2022 02:34 PM    A-G Ratio 1.0 11/18/2022 02:34 PM    ALT (SGPT) 18 11/18/2022 02:34 PM     Lab Results   Component Value Date/Time    Iron 41 (L) 06/22/2022 02:38 PM    Ferritin 22 06/22/2022 02:38 PM     Lab Results   Component Value Date/Time    Folate 16.0 06/22/2022 02:38 PM     Lab Results   Component Value Date/Time    Vitamin D 25-Hydroxy 61.3 06/22/2022 02:38 PM               Cardiac / Pulmonary Evaluation:     NA    UGI Results:     Abnormal UGI showing retained pre-pyloric stomach    Assessment:     Morbid obesity with associated comorbidity    Plan:     laparoscopic gastric bypass surgery    This is a 56 y.o. female with a BMI of Body mass index is 43.9 kg/m². and the weight-related co-morbidties as noted above. Kyree Lechuga meets the NIH criteria for bariatric surgery based upon the BMI of Body mass index is 43.9 kg/m². and   multiple weight-related co-morbidties. Kyree Lechuga has elected laparoscopic gastric bypass as her intervention of choice for treatment of morbid obestiy through surgical means secondary to its   uniform results,  profound baseline suppression of hunger and pace at which weight is lost.    In the office today, following Paul's history and physical examination, a 40 minute discussion regarding the anatomic alterations for the laparoscopic gastric bypass  was undertaken. The dietary   expectations and the patient  dependent factors for success were thoroughly discussed, to include the need for interval follow-up and long-term dietary changes associated with success. The possible short   and long term  complications of the gastric bypass were also discussed, to include but not limited to;death, DVT/PE, staple line leak, bleeding, stricture formation, infection,internal hernia  and pouch dilation. Specific weight related outcomes for success were also discussed with an emphasis on careful and close follow-up with the first year and Dietary behavior modification over the first years as baseline cyclical   hunger returns  The patient expressed an understanding of the above factors, and her questions were answered in their entirety. In addition, the patient attended a 1.5 hour power point seminar or online seminar regarding obesity, surgical weight loss including, adjustable gastric band, gastric bypass, and sleeve gastrectomy. This discussion contrasted   the different surgical techniques, mechanisms of actions and expected outcomes, and surgical and medical risks associated with each procedure.   During the in office seminar, there was a long question and answer   session where each questions was answered until there were no additional questions. Today, the patient had all of her questions answered and the decision was made today that the patient's preoperative evaluation is acceptable for them  to proceed with bariatric surgery  choosing  gastric bypass as her surgical option. She understands the above mentioned risks are elevated in a conversion procedure. She also understands there is no guarantee we will be able to proceed with the conversion until her anatomy is evaluated at the time of surgery. Secondary Diagnoses:     GERD -The patient understands that weight loss surgery is not a guaranteed cure for reflux disease but does understand the benefits that weight loss can have on reflux disease. They also understand that at the time of surgery the gastroesophageal junction will be evaluated for the presence of a diaphragmatic hernia. Hernias will be corrected always with the surgical procedure if possible and is deemed safe. The patient also understands that neither weight loss surgery nor repair of a diaphragmatic hernia repair guarantees the complete cessation of the disease. They also understand there is a possibility of recurrence of these hernias with a simple crural repair as is performed with these procedures. They understand they may have to continue their medications in the postoperative period. They have a good understanding that the gastric bypass procedure is better suited to total resolution of this issue and that neither the Lap Band or sleeve gastrectomy is considered a curative procedure as it pertains to this diagnosis. Weight Related Arthritis -The patient understands the benefits that weight loss surgery can have on their arthritis but also understands that weight loss is not a guaranteed cure and relief of symptoms is often dependent on the severity of the underlying disease.   The patient also understands that traditional pharmaceutical treatments for this diagnosis are usually unavailable to post-operative weight loss patients due to the effects on the gastrointestinal tract particularly with the gastric bypass and to a lesser effect with the sleeve gastrectomy. Any changes to the patients medication treatment will ultimately be made the patients PCP with input by our office. Restrictive Airway Disease - We will continue all of their pulmonary medications in the form of oral pills and inhalers in both the perioperative and postoperative period. They understand that their symptoms should improve with weight loss. Any further testing related to this will be turned over to their family physician or pulmonologist. The patient  understands that if they require oral or IV steroids in the future that they will notify us. This is particularly important for gastric bypass patients at all times and both sleeve  gastrectomy and gastric bypass patients in the 1 month pre op and 1 month post operative period. They understand that inhaled steroids are exempt from this. Signed By: Raissa Martinez MD     November 28, 2022           I spent a total of  50 minutes providing this service to the patient today to include discussing their surgical choice, reviewing their work-up to date, and performing a full history and physical examination.

## 2022-11-28 NOTE — PATIENT INSTRUCTIONS
Preparation for Surgery  Refer to your book for specific instructions    Stop taking all aspirin products, ibuprofen products, non-steroidal medications, blood thinners,  and herbal supplements as outlined in your book. Absolutely no smoking. If diabetic, monitor blood sugars regularly and alert the office of blood sugars over 200. Have a supply of protein product and liquid diet items for your first two weeks as outlined in your book. The day before your surgery is scheduled:    Gastric Bypass and Sleeve:  Clear liquids and Protein Shakes    Gastric Band:   Eat lightly. No snacking. Drink lots of water         6. Get prepared to meet a new you!

## 2022-11-28 NOTE — PROGRESS NOTES
Patient attended bariatric surgery pre-operative education class today. Patient appeared to have an understanding of the surgical procedure, pre-op and post-op risks, and lifestyle changes. Patient asked appropriate questions, and all questions were answered in their entirety. Patient was given a bariatric binder of resources; form acknowledging receipt of said book was completed. Lovenox injections, Prilosec, and Zofran were sent to patient's pharmacy on file.

## 2022-11-30 LAB
ATRIAL RATE: 61 BPM
CALCULATED P AXIS, ECG09: 64 DEGREES
CALCULATED R AXIS, ECG10: 16 DEGREES
CALCULATED T AXIS, ECG11: 54 DEGREES
DIAGNOSIS, 93000: NORMAL
P-R INTERVAL, ECG05: 190 MS
Q-T INTERVAL, ECG07: 440 MS
QRS DURATION, ECG06: 90 MS
QTC CALCULATION (BEZET), ECG08: 442 MS
VENTRICULAR RATE, ECG03: 61 BPM

## 2022-12-01 ENCOUNTER — HOSPITAL ENCOUNTER (OUTPATIENT)
Dept: PREADMISSION TESTING | Age: 57
Discharge: HOME | End: 2022-12-01

## 2022-12-01 VITALS — WEIGHT: 272 LBS | BODY MASS INDEX: 43.71 KG/M2 | HEIGHT: 66 IN

## 2022-12-01 RX ORDER — SODIUM CHLORIDE, SODIUM LACTATE, POTASSIUM CHLORIDE, CALCIUM CHLORIDE 600; 310; 30; 20 MG/100ML; MG/100ML; MG/100ML; MG/100ML
125 INJECTION, SOLUTION INTRAVENOUS CONTINUOUS
Status: CANCELLED | OUTPATIENT
Start: 2022-12-08

## 2022-12-01 RX ORDER — ALBUTEROL SULFATE 0.83 MG/ML
SOLUTION RESPIRATORY (INHALATION)
COMMUNITY

## 2022-12-01 RX ORDER — CIPROFLOXACIN 2 MG/ML
400 INJECTION, SOLUTION INTRAVENOUS
Status: CANCELLED | OUTPATIENT
Start: 2022-12-08 | End: 2022-12-09

## 2022-12-01 NOTE — PERIOP NOTES
PAT - SURGICAL PRE-ADMISSION INSTRUCTIONS    NAME:  Josh Collins                                                          TODAY'S DATE:  12/1/2022    SURGERY DATE:  12/8/2022                                  SURGERY ARRIVAL TIME:   TBD    Do NOT eat or drink anything, including candy or gum, after MIDNIGHT on 12/8/2022 , unless you have specific instructions from your Surgeon or Anesthesia Provider to do so. No smoking 24 hours before surgery. No alcohol 24 hours prior to the day of surgery. No recreational drugs for one week prior to the day of surgery. Leave all valuables, including money/purse, at home. Remove all jewelry, nail polish, makeup (including mascara); no lotions, powders, deodorant, or perfume/cologne/after shave. Glasses/Contact lenses and Dentures may be worn to the hospital.  They will be removed prior to surgery. Call your doctor if symptoms of a cold or illness develop within 24 ours prior to surgery. AN ADULT MUST DRIVE YOU HOME AFTER OUTPATIENT SURGERY. If you are having an OUTPATIENT procedure, please make arrangements for a responsible adult to be with you for 24 hours after your surgery. If you are admitted to the hospital, you will be assigned to a bed after surgery is complete. Normally a family member will not be able to see you until you are in your assigned bed. 12. Visitation Restrictions Explained. Pt denies an advanced directive, pt denies DNR. Special Instructions:  Pt instructed to avoid NSAIDs 7 days prior to procedure per MDA. Pt instructed to hold vitamins/supplements 2 weeks prior to procedure per MDA. Patient Prep:  use CHG solution, pt received, instructions reviewed.   These surgical instructions were reviewed with patient during the PAT phone interview

## 2022-12-07 ENCOUNTER — ANESTHESIA EVENT (OUTPATIENT)
Dept: SURGERY | Age: 57
DRG: 621 | End: 2022-12-07
Payer: COMMERCIAL

## 2022-12-08 ENCOUNTER — ANESTHESIA (OUTPATIENT)
Dept: SURGERY | Age: 57
DRG: 621 | End: 2022-12-08
Payer: COMMERCIAL

## 2022-12-08 ENCOUNTER — HOSPITAL ENCOUNTER (INPATIENT)
Age: 57
LOS: 1 days | Discharge: HOME | DRG: 621 | End: 2022-12-09
Attending: SPECIALIST | Admitting: SPECIALIST
Payer: COMMERCIAL

## 2022-12-08 ENCOUNTER — APPOINTMENT (OUTPATIENT)
Dept: SURGERY | Age: 57
End: 2022-12-08

## 2022-12-08 DIAGNOSIS — G89.18 POST-OP PAIN: Primary | ICD-10-CM

## 2022-12-08 PROBLEM — E66.01 MORBID OBESITY WITH BMI OF 40.0-44.9, ADULT (HCC): Status: ACTIVE | Noted: 2022-12-08

## 2022-12-08 LAB
ABO + RH BLD: NORMAL
BLOOD GROUP ANTIBODIES SERPL: NORMAL
HCG UR QL: NEGATIVE
SPECIMEN EXP DATE BLD: NORMAL

## 2022-12-08 PROCEDURE — 86900 BLOOD TYPING SEROLOGIC ABO: CPT

## 2022-12-08 PROCEDURE — 77030008603 HC TRCR ENDOSC EPATH J&J -C: Performed by: SPECIALIST

## 2022-12-08 PROCEDURE — 77030002896 HC SUT CLP ABSRB J&J -B: Performed by: SPECIALIST

## 2022-12-08 PROCEDURE — 76060000035 HC ANESTHESIA 2 TO 2.5 HR: Performed by: SPECIALIST

## 2022-12-08 PROCEDURE — 88313 SPECIAL STAINS GROUP 2: CPT

## 2022-12-08 PROCEDURE — 77030010515 HC APPL ENDOCLP LIG J&J -B: Performed by: SPECIALIST

## 2022-12-08 PROCEDURE — 77030008518 HC TBNG INSUF ENDO STRY -B: Performed by: SPECIALIST

## 2022-12-08 PROCEDURE — 77030005513 HC CATH URETH FOL11 MDII -B: Performed by: SPECIALIST

## 2022-12-08 PROCEDURE — 65270000029 HC RM PRIVATE

## 2022-12-08 PROCEDURE — 81025 URINE PREGNANCY TEST: CPT

## 2022-12-08 PROCEDURE — 77030009851 HC PCH RTVR ENDOSC AMR -B: Performed by: SPECIALIST

## 2022-12-08 PROCEDURE — 77030027138 HC INCENT SPIROMETER -A: Performed by: SPECIALIST

## 2022-12-08 PROCEDURE — 77030011810 HC STPLR ENDOSC J&J -G: Performed by: SPECIALIST

## 2022-12-08 PROCEDURE — 77030041460 HC APL VISTASEAL J&J -B: Performed by: SPECIALIST

## 2022-12-08 PROCEDURE — 0D164ZA BYPASS STOMACH TO JEJUNUM, PERCUTANEOUS ENDOSCOPIC APPROACH: ICD-10-PCS | Performed by: SPECIALIST

## 2022-12-08 PROCEDURE — 77030008602 HC TRCR ENDOSC EPATH J&J -B: Performed by: SPECIALIST

## 2022-12-08 PROCEDURE — 77030002986 HC SUT PROL J&J -A: Performed by: SPECIALIST

## 2022-12-08 PROCEDURE — 74011250636 HC RX REV CODE- 250/636: Performed by: ANESTHESIOLOGY

## 2022-12-08 PROCEDURE — 76210000063 HC OR PH I REC FIRST 0.5 HR: Performed by: SPECIALIST

## 2022-12-08 PROCEDURE — 77030002916 HC SUT ETHLN J&J -A: Performed by: SPECIALIST

## 2022-12-08 PROCEDURE — 77030002966 HC SUT PDS J&J -A: Performed by: SPECIALIST

## 2022-12-08 PROCEDURE — 77030009426 HC FCPS BIOP ENDOSC BSC -B: Performed by: SPECIALIST

## 2022-12-08 PROCEDURE — 77030003580 HC NDL INSUF VERES J&J -B: Performed by: SPECIALIST

## 2022-12-08 PROCEDURE — 77030008574 HC TBNG SUC IRR STRY -B: Performed by: SPECIALIST

## 2022-12-08 PROCEDURE — 88305 TISSUE EXAM BY PATHOLOGIST: CPT

## 2022-12-08 PROCEDURE — 77030014008 HC SPNG HEMSTAT J&J -C: Performed by: SPECIALIST

## 2022-12-08 PROCEDURE — 77030011808 HC STPLR ENDOSCOPIC J&J -D: Performed by: SPECIALIST

## 2022-12-08 PROCEDURE — 88307 TISSUE EXAM BY PATHOLOGIST: CPT

## 2022-12-08 PROCEDURE — 77030008565 HC TBNG SUC IRR ERBE -B: Performed by: SPECIALIST

## 2022-12-08 PROCEDURE — 77030020782 HC GWN BAIR PAWS FLX 3M -B: Performed by: SPECIALIST

## 2022-12-08 PROCEDURE — 77030009968 HC RELD STPLR ENDOSC J&J -D: Performed by: SPECIALIST

## 2022-12-08 PROCEDURE — 74011000250 HC RX REV CODE- 250: Performed by: ANESTHESIOLOGY

## 2022-12-08 PROCEDURE — 77030041523 HC SEALNT FIBRN VITASEAL J&J -E: Performed by: SPECIALIST

## 2022-12-08 PROCEDURE — 77030009967 HC RELD STPLR ENDOSC J&J -C: Performed by: SPECIALIST

## 2022-12-08 PROCEDURE — 77030002901 HC SUT DEV MCLOSE MPSA - B: Performed by: SPECIALIST

## 2022-12-08 PROCEDURE — 77030036732 HC RELD STPLR VASC J&J -F: Performed by: SPECIALIST

## 2022-12-08 PROCEDURE — 74011250637 HC RX REV CODE- 250/637: Performed by: SPECIALIST

## 2022-12-08 PROCEDURE — 77030002933 HC SUT MCRYL J&J -A: Performed by: SPECIALIST

## 2022-12-08 PROCEDURE — 74011250636 HC RX REV CODE- 250/636: Performed by: SPECIALIST

## 2022-12-08 PROCEDURE — 2709999900 HC NON-CHARGEABLE SUPPLY: Performed by: SPECIALIST

## 2022-12-08 PROCEDURE — 74011250637 HC RX REV CODE- 250/637: Performed by: ANESTHESIOLOGY

## 2022-12-08 PROCEDURE — 0DB78ZX EXCISION OF STOMACH, PYLORUS, VIA NATURAL OR ARTIFICIAL OPENING ENDOSCOPIC, DIAGNOSTIC: ICD-10-PCS | Performed by: SPECIALIST

## 2022-12-08 PROCEDURE — 77030016151 HC PROTCTR LNS DFOG COVD -B: Performed by: SPECIALIST

## 2022-12-08 PROCEDURE — 0DNW4ZZ RELEASE PERITONEUM, PERCUTANEOUS ENDOSCOPIC APPROACH: ICD-10-PCS | Performed by: SPECIALIST

## 2022-12-08 PROCEDURE — 77030040506 HC DRN WND MDII -A: Performed by: SPECIALIST

## 2022-12-08 PROCEDURE — 74011000250 HC RX REV CODE- 250: Performed by: SPECIALIST

## 2022-12-08 PROCEDURE — 77030034154 HC SHR COAG HARM ACE J&J -F: Performed by: SPECIALIST

## 2022-12-08 PROCEDURE — 77030010030: Performed by: SPECIALIST

## 2022-12-08 PROCEDURE — 76010000131 HC OR TIME 2 TO 2.5 HR: Performed by: SPECIALIST

## 2022-12-08 PROCEDURE — 36415 COLL VENOUS BLD VENIPUNCTURE: CPT

## 2022-12-08 PROCEDURE — 0FB24ZX EXCISION OF LEFT LOBE LIVER, PERCUTANEOUS ENDOSCOPIC APPROACH, DIAGNOSTIC: ICD-10-PCS | Performed by: SPECIALIST

## 2022-12-08 PROCEDURE — 77030002912 HC SUT ETHBND J&J -A: Performed by: SPECIALIST

## 2022-12-08 PROCEDURE — 77030040361 HC SLV COMPR DVT MDII -B: Performed by: SPECIALIST

## 2022-12-08 RX ORDER — ONDANSETRON 2 MG/ML
4 INJECTION INTRAMUSCULAR; INTRAVENOUS ONCE
Status: COMPLETED | OUTPATIENT
Start: 2022-12-08 | End: 2022-12-08

## 2022-12-08 RX ORDER — CIPROFLOXACIN 2 MG/ML
400 INJECTION, SOLUTION INTRAVENOUS EVERY 12 HOURS
Status: COMPLETED | OUTPATIENT
Start: 2022-12-08 | End: 2022-12-08

## 2022-12-08 RX ORDER — NYSTATIN 100000 [USP'U]/ML
500000 SUSPENSION ORAL
Status: COMPLETED | OUTPATIENT
Start: 2022-12-08 | End: 2022-12-08

## 2022-12-08 RX ORDER — LIDOCAINE HYDROCHLORIDE 20 MG/ML
INJECTION, SOLUTION EPIDURAL; INFILTRATION; INTRACAUDAL; PERINEURAL AS NEEDED
Status: DISCONTINUED | OUTPATIENT
Start: 2022-12-08 | End: 2022-12-08 | Stop reason: HOSPADM

## 2022-12-08 RX ORDER — ENOXAPARIN SODIUM 100 MG/ML
40 INJECTION SUBCUTANEOUS EVERY 12 HOURS
Status: DISCONTINUED | OUTPATIENT
Start: 2022-12-08 | End: 2022-12-09 | Stop reason: HOSPADM

## 2022-12-08 RX ORDER — METOCLOPRAMIDE HYDROCHLORIDE 5 MG/ML
INJECTION INTRAMUSCULAR; INTRAVENOUS AS NEEDED
Status: DISCONTINUED | OUTPATIENT
Start: 2022-12-08 | End: 2022-12-08 | Stop reason: HOSPADM

## 2022-12-08 RX ORDER — ROCURONIUM BROMIDE 10 MG/ML
INJECTION, SOLUTION INTRAVENOUS AS NEEDED
Status: DISCONTINUED | OUTPATIENT
Start: 2022-12-08 | End: 2022-12-08 | Stop reason: HOSPADM

## 2022-12-08 RX ORDER — BUPIVACAINE HYDROCHLORIDE AND EPINEPHRINE 2.5; 5 MG/ML; UG/ML
INJECTION, SOLUTION EPIDURAL; INFILTRATION; INTRACAUDAL; PERINEURAL AS NEEDED
Status: DISCONTINUED | OUTPATIENT
Start: 2022-12-08 | End: 2022-12-08 | Stop reason: HOSPADM

## 2022-12-08 RX ORDER — SODIUM CHLORIDE 0.9 % (FLUSH) 0.9 %
5-40 SYRINGE (ML) INJECTION EVERY 8 HOURS
Status: DISCONTINUED | OUTPATIENT
Start: 2022-12-08 | End: 2022-12-08

## 2022-12-08 RX ORDER — ENOXAPARIN SODIUM 100 MG/ML
40 INJECTION SUBCUTANEOUS EVERY 12 HOURS
Status: DISCONTINUED | OUTPATIENT
Start: 2022-12-08 | End: 2022-12-08 | Stop reason: SDUPTHER

## 2022-12-08 RX ORDER — MAGNESIUM SULFATE 100 %
4 CRYSTALS MISCELLANEOUS AS NEEDED
Status: DISCONTINUED | OUTPATIENT
Start: 2022-12-08 | End: 2022-12-08

## 2022-12-08 RX ORDER — SUCCINYLCHOLINE CHLORIDE 100 MG/5ML
SYRINGE (ML) INTRAVENOUS AS NEEDED
Status: DISCONTINUED | OUTPATIENT
Start: 2022-12-08 | End: 2022-12-08 | Stop reason: HOSPADM

## 2022-12-08 RX ORDER — KETOROLAC TROMETHAMINE 30 MG/ML
30 INJECTION, SOLUTION INTRAMUSCULAR; INTRAVENOUS EVERY 6 HOURS
Status: COMPLETED | OUTPATIENT
Start: 2022-12-08 | End: 2022-12-09

## 2022-12-08 RX ORDER — NALOXONE HYDROCHLORIDE 0.4 MG/ML
0.4 INJECTION, SOLUTION INTRAMUSCULAR; INTRAVENOUS; SUBCUTANEOUS AS NEEDED
Status: DISCONTINUED | OUTPATIENT
Start: 2022-12-08 | End: 2022-12-09 | Stop reason: HOSPADM

## 2022-12-08 RX ORDER — MORPHINE SULFATE 4 MG/ML
6 INJECTION INTRAVENOUS
Status: DISCONTINUED | OUTPATIENT
Start: 2022-12-08 | End: 2022-12-09

## 2022-12-08 RX ORDER — ENOXAPARIN SODIUM 100 MG/ML
40 INJECTION SUBCUTANEOUS ONCE
Status: COMPLETED | OUTPATIENT
Start: 2022-12-08 | End: 2022-12-08

## 2022-12-08 RX ORDER — SODIUM CHLORIDE 0.9 % (FLUSH) 0.9 %
5-40 SYRINGE (ML) INJECTION AS NEEDED
Status: DISCONTINUED | OUTPATIENT
Start: 2022-12-08 | End: 2022-12-08

## 2022-12-08 RX ORDER — FAMOTIDINE 20 MG/50ML
20 INJECTION, SOLUTION INTRAVENOUS ONCE
Status: DISCONTINUED | OUTPATIENT
Start: 2022-12-08 | End: 2022-12-08 | Stop reason: RX

## 2022-12-08 RX ORDER — KETOROLAC TROMETHAMINE 15 MG/ML
INJECTION, SOLUTION INTRAMUSCULAR; INTRAVENOUS AS NEEDED
Status: DISCONTINUED | OUTPATIENT
Start: 2022-12-08 | End: 2022-12-08 | Stop reason: HOSPADM

## 2022-12-08 RX ORDER — FENTANYL CITRATE 50 UG/ML
50 INJECTION, SOLUTION INTRAMUSCULAR; INTRAVENOUS
Status: DISCONTINUED | OUTPATIENT
Start: 2022-12-08 | End: 2022-12-08

## 2022-12-08 RX ORDER — DEXTROSE MONOHYDRATE 100 MG/ML
0-250 INJECTION, SOLUTION INTRAVENOUS AS NEEDED
Status: DISCONTINUED | OUTPATIENT
Start: 2022-12-08 | End: 2022-12-08

## 2022-12-08 RX ORDER — CIPROFLOXACIN 2 MG/ML
400 INJECTION, SOLUTION INTRAVENOUS
Status: COMPLETED | OUTPATIENT
Start: 2022-12-08 | End: 2022-12-08

## 2022-12-08 RX ORDER — GLYCOPYRROLATE 0.2 MG/ML
INJECTION INTRAMUSCULAR; INTRAVENOUS AS NEEDED
Status: DISCONTINUED | OUTPATIENT
Start: 2022-12-08 | End: 2022-12-08 | Stop reason: HOSPADM

## 2022-12-08 RX ORDER — ONDANSETRON 2 MG/ML
INJECTION INTRAMUSCULAR; INTRAVENOUS AS NEEDED
Status: DISCONTINUED | OUTPATIENT
Start: 2022-12-08 | End: 2022-12-08 | Stop reason: HOSPADM

## 2022-12-08 RX ORDER — PHENYLEPHRINE HCL IN 0.9% NACL 1 MG/10 ML
SYRINGE (ML) INTRAVENOUS AS NEEDED
Status: DISCONTINUED | OUTPATIENT
Start: 2022-12-08 | End: 2022-12-08 | Stop reason: HOSPADM

## 2022-12-08 RX ORDER — EPHEDRINE SULFATE/0.9% NACL/PF 50 MG/5 ML
SYRINGE (ML) INTRAVENOUS AS NEEDED
Status: DISCONTINUED | OUTPATIENT
Start: 2022-12-08 | End: 2022-12-08 | Stop reason: HOSPADM

## 2022-12-08 RX ORDER — FENTANYL CITRATE 50 UG/ML
INJECTION, SOLUTION INTRAMUSCULAR; INTRAVENOUS AS NEEDED
Status: DISCONTINUED | OUTPATIENT
Start: 2022-12-08 | End: 2022-12-08 | Stop reason: HOSPADM

## 2022-12-08 RX ORDER — HYDROMORPHONE HYDROCHLORIDE 1 MG/ML
0.2 INJECTION, SOLUTION INTRAMUSCULAR; INTRAVENOUS; SUBCUTANEOUS AS NEEDED
Status: DISCONTINUED | OUTPATIENT
Start: 2022-12-08 | End: 2022-12-08

## 2022-12-08 RX ORDER — PROPOFOL 10 MG/ML
INJECTION, EMULSION INTRAVENOUS AS NEEDED
Status: DISCONTINUED | OUTPATIENT
Start: 2022-12-08 | End: 2022-12-08 | Stop reason: HOSPADM

## 2022-12-08 RX ORDER — MIDAZOLAM HYDROCHLORIDE 1 MG/ML
INJECTION, SOLUTION INTRAMUSCULAR; INTRAVENOUS AS NEEDED
Status: DISCONTINUED | OUTPATIENT
Start: 2022-12-08 | End: 2022-12-08 | Stop reason: HOSPADM

## 2022-12-08 RX ORDER — SODIUM CHLORIDE, SODIUM LACTATE, POTASSIUM CHLORIDE, CALCIUM CHLORIDE 600; 310; 30; 20 MG/100ML; MG/100ML; MG/100ML; MG/100ML
125 INJECTION, SOLUTION INTRAVENOUS CONTINUOUS
Status: DISCONTINUED | OUTPATIENT
Start: 2022-12-08 | End: 2022-12-08

## 2022-12-08 RX ORDER — ENOXAPARIN SODIUM 100 MG/ML
40 INJECTION SUBCUTANEOUS EVERY 24 HOURS
Status: DISCONTINUED | OUTPATIENT
Start: 2022-12-09 | End: 2022-12-08

## 2022-12-08 RX ORDER — NALOXONE HYDROCHLORIDE 0.4 MG/ML
0.05 INJECTION, SOLUTION INTRAMUSCULAR; INTRAVENOUS; SUBCUTANEOUS AS NEEDED
Status: DISCONTINUED | OUTPATIENT
Start: 2022-12-08 | End: 2022-12-08

## 2022-12-08 RX ORDER — ONDANSETRON 2 MG/ML
4 INJECTION INTRAMUSCULAR; INTRAVENOUS
Status: DISCONTINUED | OUTPATIENT
Start: 2022-12-08 | End: 2022-12-09 | Stop reason: HOSPADM

## 2022-12-08 RX ORDER — SODIUM CHLORIDE, SODIUM LACTATE, POTASSIUM CHLORIDE, CALCIUM CHLORIDE 600; 310; 30; 20 MG/100ML; MG/100ML; MG/100ML; MG/100ML
150 INJECTION, SOLUTION INTRAVENOUS CONTINUOUS
Status: DISCONTINUED | OUTPATIENT
Start: 2022-12-08 | End: 2022-12-09 | Stop reason: HOSPADM

## 2022-12-08 RX ORDER — ACETAMINOPHEN 500 MG
1000 TABLET ORAL ONCE
Status: COMPLETED | OUTPATIENT
Start: 2022-12-08 | End: 2022-12-08

## 2022-12-08 RX ADMIN — ROCURONIUM BROMIDE 40 MG: 10 INJECTION, SOLUTION INTRAVENOUS at 09:36

## 2022-12-08 RX ADMIN — Medication 10 MG: at 09:50

## 2022-12-08 RX ADMIN — SODIUM CHLORIDE, POTASSIUM CHLORIDE, SODIUM LACTATE AND CALCIUM CHLORIDE 150 ML/HR: 600; 310; 30; 20 INJECTION, SOLUTION INTRAVENOUS at 20:42

## 2022-12-08 RX ADMIN — ENOXAPARIN SODIUM 40 MG: 100 INJECTION SUBCUTANEOUS at 18:22

## 2022-12-08 RX ADMIN — MORPHINE SULFATE 6 MG: 4 INJECTION INTRAVENOUS at 19:42

## 2022-12-08 RX ADMIN — FENTANYL CITRATE 25 MCG: 50 INJECTION, SOLUTION INTRAMUSCULAR; INTRAVENOUS at 10:29

## 2022-12-08 RX ADMIN — MIDAZOLAM 2 MG: 1 INJECTION INTRAMUSCULAR; INTRAVENOUS at 09:09

## 2022-12-08 RX ADMIN — LIDOCAINE HYDROCHLORIDE 100 MG: 20 INJECTION, SOLUTION INTRAVENOUS at 09:26

## 2022-12-08 RX ADMIN — ONDANSETRON HYDROCHLORIDE 4 MG: 2 INJECTION INTRAMUSCULAR; INTRAVENOUS at 11:02

## 2022-12-08 RX ADMIN — PROPOFOL 200 MG: 10 INJECTION, EMULSION INTRAVENOUS at 09:26

## 2022-12-08 RX ADMIN — Medication 150 MCG: at 10:23

## 2022-12-08 RX ADMIN — Medication 200 MCG: at 09:33

## 2022-12-08 RX ADMIN — ACETAMINOPHEN 1000 MG: 500 TABLET ORAL at 07:13

## 2022-12-08 RX ADMIN — FENTANYL CITRATE 50 MCG: 50 INJECTION, SOLUTION INTRAMUSCULAR; INTRAVENOUS at 09:16

## 2022-12-08 RX ADMIN — GLYCOPYRROLATE 0.2 MG: 0.2 INJECTION INTRAMUSCULAR; INTRAVENOUS at 09:43

## 2022-12-08 RX ADMIN — NYSTATIN 500000 UNITS: 100000 SUSPENSION ORAL at 07:12

## 2022-12-08 RX ADMIN — Medication 50 MCG: at 10:52

## 2022-12-08 RX ADMIN — PROPOFOL 30 MG: 10 INJECTION, EMULSION INTRAVENOUS at 10:58

## 2022-12-08 RX ADMIN — SODIUM CHLORIDE, POTASSIUM CHLORIDE, SODIUM LACTATE AND CALCIUM CHLORIDE 150 ML/HR: 600; 310; 30; 20 INJECTION, SOLUTION INTRAVENOUS at 12:37

## 2022-12-08 RX ADMIN — SODIUM CHLORIDE, SODIUM LACTATE, POTASSIUM CHLORIDE, AND CALCIUM CHLORIDE 125 ML/HR: 600; 310; 30; 20 INJECTION, SOLUTION INTRAVENOUS at 07:08

## 2022-12-08 RX ADMIN — KETOROLAC TROMETHAMINE 30 MG: 15 INJECTION, SOLUTION INTRAMUSCULAR; INTRAVENOUS at 10:55

## 2022-12-08 RX ADMIN — FENTANYL CITRATE 25 MCG: 50 INJECTION, SOLUTION INTRAMUSCULAR; INTRAVENOUS at 10:58

## 2022-12-08 RX ADMIN — KETOROLAC TROMETHAMINE 30 MG: 30 INJECTION, SOLUTION INTRAMUSCULAR; INTRAVENOUS at 17:03

## 2022-12-08 RX ADMIN — KETOROLAC TROMETHAMINE 30 MG: 30 INJECTION, SOLUTION INTRAMUSCULAR; INTRAVENOUS at 23:16

## 2022-12-08 RX ADMIN — HYDROMORPHONE HYDROCHLORIDE 0.2 MG: 1 INJECTION, SOLUTION INTRAMUSCULAR; INTRAVENOUS; SUBCUTANEOUS at 11:37

## 2022-12-08 RX ADMIN — ENOXAPARIN SODIUM 40 MG: 100 INJECTION SUBCUTANEOUS at 07:12

## 2022-12-08 RX ADMIN — FENTANYL CITRATE 50 MCG: 50 INJECTION, SOLUTION INTRAMUSCULAR; INTRAVENOUS at 09:55

## 2022-12-08 RX ADMIN — ROCURONIUM BROMIDE 10 MG: 10 INJECTION, SOLUTION INTRAVENOUS at 10:12

## 2022-12-08 RX ADMIN — FENTANYL CITRATE 50 MCG: 50 INJECTION, SOLUTION INTRAMUSCULAR; INTRAVENOUS at 09:42

## 2022-12-08 RX ADMIN — ONDANSETRON 4 MG: 2 INJECTION INTRAMUSCULAR; INTRAVENOUS at 11:37

## 2022-12-08 RX ADMIN — PROPOFOL 50 MG: 10 INJECTION, EMULSION INTRAVENOUS at 09:53

## 2022-12-08 RX ADMIN — CIPROFLOXACIN 400 MG: 2 INJECTION, SOLUTION INTRAVENOUS at 09:36

## 2022-12-08 RX ADMIN — FENTANYL CITRATE 50 MCG: 0.05 INJECTION, SOLUTION INTRAMUSCULAR; INTRAVENOUS at 12:05

## 2022-12-08 RX ADMIN — Medication 100 MCG: at 09:50

## 2022-12-08 RX ADMIN — Medication 140 MG: at 09:27

## 2022-12-08 RX ADMIN — SUGAMMADEX 250 MG: 100 INJECTION, SOLUTION INTRAVENOUS at 11:04

## 2022-12-08 RX ADMIN — SODIUM CHLORIDE, SODIUM LACTATE, POTASSIUM CHLORIDE, AND CALCIUM CHLORIDE 125 ML/HR: 600; 310; 30; 20 INJECTION, SOLUTION INTRAVENOUS at 11:34

## 2022-12-08 RX ADMIN — Medication 50 MCG: at 10:49

## 2022-12-08 RX ADMIN — PROPOFOL 30 MG: 10 INJECTION, EMULSION INTRAVENOUS at 10:29

## 2022-12-08 RX ADMIN — SODIUM CHLORIDE, PRESERVATIVE FREE 20 MG: 5 INJECTION INTRAVENOUS at 07:13

## 2022-12-08 RX ADMIN — CIPROFLOXACIN 400 MG: 2 INJECTION, SOLUTION INTRAVENOUS at 20:42

## 2022-12-08 RX ADMIN — METOCLOPRAMIDE 10 MG: 5 INJECTION, SOLUTION INTRAMUSCULAR; INTRAVENOUS at 11:00

## 2022-12-08 RX ADMIN — MORPHINE SULFATE 6 MG: 4 INJECTION INTRAVENOUS at 14:35

## 2022-12-08 NOTE — INTERVAL H&P NOTE
Update History & Physical    The Patient's History and Physical of November 28, 2022 was reviewed with the patient and I examined the patient. There was no change. The surgical site was confirmed by the patient and me. Plan:  The risk, benefits, expected outcome, and alternative to the recommended procedure have been discussed with the patient. Patient understands and wants to proceed with the procedure.     Electronically signed by Melinda Mcelroy MD on 12/8/2022 at 6:49 AM

## 2022-12-08 NOTE — OP NOTES
OPERATIVE REPORT         Patient:Paul Barrett   : 1965  Medical Record Number:[id number]    Pre-operative Diagnosis:  MORBID OBESITY, BMI 46, FATTY LIVER  Post-operative Diagnosis: MORBID OBESITY, BMI 46, FATTY LIVER  Procedure: Procedure(s):  CONVERSION TO LAPAROSCOPIC GASTRIC BYPASS- ANTECOLIC / ANTEGASTRIC  LYSIS OF ADHESIONS  WEDGE LIVER BIOPSY  INTRAOPERATIVE ENDOSCOPY WITH BIOPSY  Location: [facility]  Surgeon: Ginette Ellsworth MD  Assistant:  Lona [facility]  - (there are no qualified interns, residents, or any other qualified house   physicians available to assist with this surgery) performed retraction of various structures,  assisted in   creation of the gastric sleeve, fired stapling devices, obtained hemostasis along staple lines via hemoclips,       Anesthesia: General       Specimens: 1. Gastric Sleeve Resection                       2. Liver Wedge Biopsy                       3. Endoscopy biopsy    EBL: less than 5cc  Additional Findings: None  Complications: None     INDICATIONS:  The patient is a patient of Dr Sondra Guajardo who about 2 years ago underwent a laparoscopic sleeve gastrectomy for diagnosis of morbid obesity. Unfortunately, very early after surgery, she developed some reflux symptoms and as she gained some additional weight during the pandemic, she began to experience regurgitation symptoms. It got to the point where she finally presented to us in consultation for her situation, and we assessed her via upper GI study to assess her gastric pouch. On upper GI study, there was no evidence of any stricture, moderate evidence of  dilation of her  pouch, and she did have spontaneous reflux episodes. Despite maximal PPI therapy, at this juncture, she understands that the best course of action would be a conversion procedure to the gastric bypass procedure for her situation.   She does understand that the conversion surgery will provide her with additional weight loss, in addition would provide her almost certain cure of her reflux symptoms. She does wish to proceed understanding the additional risks as it pertains to surgery. PROCEDURE:  The patient was brought to the operating room, placed on the table in supine position, at which time, general anesthesia was administered without any difficulty. Her abdomen was then prepped and draped in the usual sterile fashion. Using a 15-blade, a 1-cm incision was made just to the left of the umbilicus. Veress needle approach was used to gain access to the peritoneal cavity, which was then insufflated. Yelena Vasques was then placed at that site. Then four additional trocars placed in a U-shaped configuration. On entering the abdomen, the patient noted to have classic adhesions consistent with the prior surgery. I began to lyse these adhesions centrally on abdominal wall. I then moved cephalad towards the left subhepatic space, clearing the entire left subhepatic space, all the way to the level of diaphragm, and then I cleared the lateral sleeve all the way to the level of diaphragm, and the adhesiolysis took about 20 minutes to achieve. It was at this juncture, I placed a calibration tube into the proximal sleeve and inflated with 15 mL of saline solution and brought it back towards the gastroesophageal junction. It was at this juncture, I then dissected along the lesser curvature of the stomach, entering the retrogastric space, and I knew at this juncture, I could convert it to a gastric bypass procedure. Calibration tube itself was then removed, and I turned my attention towards the small bowel portion of the operation. I did locate the ligament of Treitz. I then transected the small bowel using the Endo MALIKA stapler with white reloads, then undercut the mesentery of the small bowel using two firings of the Endo MALIKA stapler, which allowed for excellent mobilization to the upper abdominal region.   I then measured off a 150 cm Laila limb bypass and placed it in a side-to-side fashion with the afferent limb. At this juncture, I then placed stay sutures in the two limbs of the bowel, and then I created enterotomies in the two limbs of the bowel, placing the stapling device intraluminally. I then closed it and fired it, creating the linear anastomosis. With this anastomosis being hemostatic, free margin of the enterotomy was then reapproximated using 2-0 Ethibond suture. These sutures were then held up to facilitate closure using stapling device. Once this was all achieved, I then closed the mesenteric defect at that site using running 2-0 Ethibond suture. The Laila limb now was brought over the colon and divided in the midline. It reached effortlessly to the diaphragm under no tension at all. I then addressed the gastric sleeve,creating a distal gastrotomy lateral in the distal two-thirds of the sleeve. I then placed the cap of a 21 ILS stapler transabdominally. I guided it through the lateral gastrotomy and then out through the anterior gastric pouch, using a Flamingo grasper and Prolene suture attached to the cap. After retrieving the cap, a pursestring suture was then placed at the base and tied securely. I then created the new gastric pouch using the Stonega 60 stapler with black reloads, firings two firings across the base of the planned pouch, and this went without event. It is of note that I did not have to down size the lumen of the sleeve since all of her dilation was distal to the incisura. All areas were now hemostatic. The lateral gastrotomy was then closed  using the stapling device. I then brought the Laila limb at this juncture in an antecolic, antegastric fashion. It reached nicely to the level of the pouch under no tension at all. I then opened up the free end using the Harmonic scalpel and guided the circular stapling device transabdominally through the left lower quadrant incision.   It was then guided through the enterotomy, then I deployed the spear through the antimesenteric border of the bowel. It was then attached to the cap, closed and fired creating the circular anastomosis. With two very good tissue rings noted within the stapling device, the free margin of the Laila limb was then trimmed free using the Endo MALIKA stapler with white reloads. I then, at this juncture, oversewed the anastomosis using 2-0 Ethibond suture. I then went to the head of the table, performed endoscopy on the patient. The patient's oropharynx was normal.  Distal esophagus was normal.  There was no bleeding noted at the anastomosis, and biopsy was taken of the pouch wall and submitted for H. pylori assessment. After going back towards the abdominal portion of the operation, all areas were checked for hemostasis, and were noted be completely intact. I then placed Vistaseal along all staple lines and Surgicel along all staple lines. At this time period, I then removed the liver retractor and due to significant massive enlargement of the liver and nodularity throughout, I did biopsy the left lobe of the liver and I submitted it to Pathology for permanent section. All areas were then checked for hemostasis. A SWETA drain was placed in the upper abdomen. All trocars were then removed under visualization and the left lower quadrant trocar site was closed using transabdominal 0-PDS suture along the fascia. All skin incisions were then closed using 4-0 subcuticular Monocryl and Steri-Strips and sterile dressings were applied. The patient tolerated the procedure well.      Regulo Montano MD

## 2022-12-08 NOTE — PERIOP NOTES
Reviewed PTA medication list with patient/caregiver and patient/caregiver denies any additional medications. Patient admits to having a responsible adult care for them at home for at least 24 hours after surgery. Patient encouraged to use gown warming system and informed that using said warming gown to regulate body temperature prior to a procedure has been shown to help reduce the risks of blood clots and infection. Patient's pharmacy of choice verified and documented in PTA medication section. Dual skin assessment & fall risk band verification completed with Oliver Cha RN.

## 2022-12-08 NOTE — ANESTHESIA PREPROCEDURE EVALUATION
Relevant Problems   RESPIRATORY SYSTEM   (+) Asthma      GASTROINTESTINAL   (+) GERD (gastroesophageal reflux disease)      ENDOCRINE   (+) Arthritis of both knees   (+) Morbid obesity (HCC)      HEMATOLOGY   (+) Anemia       Anesthetic History   No history of anesthetic complications            Review of Systems / Medical History  Patient summary reviewed and pertinent labs reviewed    Pulmonary            Asthma (mild intermittent (occasionally will use albuterol inhaler PRN)) : well controlled       Neuro/Psych   Within defined limits           Cardiovascular  Within defined limits              Pertinent negatives: No past MI and CHF  Exercise tolerance: >4 METS: Able to walk 2 city blocks (does have back issues)     GI/Hepatic/Renal     GERD: well controlled           Endo/Other        Morbid obesity and arthritis     Other Findings              Physical Exam    Airway  Mallampati: II  TM Distance: > 6 cm  Neck ROM: normal range of motion   Mouth opening: Normal    Comments: + prognath Cardiovascular    Rhythm: regular  Rate: normal         Dental         Pulmonary  Breath sounds clear to auscultation               Abdominal  GI exam deferred       Other Findings            Anesthetic Plan    ASA: 3  Anesthesia type: general    Monitoring Plan: Continuous noninvasive hemodynamic monitoring      Induction: Intravenous and RSI  Anesthetic plan and risks discussed with: Patient      All questions answered. Agrees to proceed under GETA.

## 2022-12-08 NOTE — PERIOP NOTES
18 [facility] made aware that SBAR is ready for review. Patient assigned room #216.

## 2022-12-08 NOTE — ANESTHESIA POSTPROCEDURE EVALUATION
Procedure(s):  CONVERSION TO LAPAROSCOPIC GASTRIC BYPASS, LYSIS OF ADHESIONS, WEDGE LIVER BIOPSY AND INTRAOPERATIVE ENDOSCOPY WITH BIOPSY. general    Anesthesia Post Evaluation      Multimodal analgesia: multimodal analgesia used between 6 hours prior to anesthesia start to PACU discharge  Patient location during evaluation: PACU  Patient participation: complete - patient participated  Level of consciousness: awake and alert  Pain score: 3  Pain management: adequate  Airway patency: patent  Anesthetic complications: no  Cardiovascular status: acceptable  Respiratory status: room air and spontaneous ventilation  Hydration status: euvolemic  Post anesthesia nausea and vomiting:  none  Final Post Anesthesia Temperature Assessment:  Normothermia (36.0-37.5 degrees C)      INITIAL Post-op Vital signs:   Vitals Value Taken Time   /63 12/08/22 1203   Temp     Pulse 55 12/08/22 1204   Resp 19 12/08/22 1204   SpO2 100 % 12/08/22 1204   Vitals shown include unvalidated device data.

## 2022-12-08 NOTE — NURSE NAVIGATOR
Patient's friend at bedside. Patient alert throughout visit. Patient complained of expected pain with mild nausea. Vitals: Visit Vitals  BP (!) 164/68   Pulse (!) 53   Temp 97.1 °F (36.2 °C)   Resp 20   Ht 5' 6\" (1.676 m)   Wt 124 kg (273 lb 6.4 oz)   LMP 09/12/2022   SpO2 98%   BMI 44.13 kg/m²     General: Alert & oriented to person, place, time, and situation  Pulmonary: Lungs clear to auscultation in all fields. Cardiac: Regular rate and rhythm  Abdomen: Appropriate tenderness; soft; non-distended; hypo-active bowel sounds  Lap sites: Within normal limits  SWETA drain: Small amount of serosanguinous drainage  Brown catheter: 200 cc clear, yellow urine  SCD's: In place and operating WNL    Plan:  -Continue medications for symptom management  -Encourage ambulation  -SCD use when in bed  -IS 10 times an hour  -NPO  -UGI in AM; bariatric full liquid diet  if UGI within normal limits  -Brown removed in AM    Plan was discussed with patient, as well as IS teaching provided. Patient verbalized understanding to plan and education. Patient completed IS x3 during this visit with no issues or concerns noted by this RN. She reached 1,250 mL.

## 2022-12-08 NOTE — ROUTINE PROCESS
TRANSFER - IN REPORT:    Verbal report received from CHRIS Wilder RN(name) on Junior Rodriguez  being received from [employer]) for routine post - op      Report consisted of patients Situation, Background, Assessment and   Recommendations(SBAR). Information from the following report(s) SBAR, Kardex, [facility] and Recent Results was reviewed with the receiving nurse. Opportunity for questions and clarification was provided. Assessment completed upon patients arrival to unit and care assumed.

## 2022-12-08 NOTE — PERIOP NOTES
TRANSFER - OUT REPORT:    Verbal report given to Radhika Rn(name) on Kassi Ge  being transferred to [address] [place] [address](unit) for routine post - op       Report consisted of patients Situation, Background, Assessment and   Recommendations(SBAR). Information from the following report(s) SBAR, Kardex, OR Summary, Procedure Summary, Intake/Output, and MAR was reviewed with the receiving nurse. Lines:   Peripheral IV 12/08/22 Anterior;Right Forearm (Active)   Site Assessment Clean, dry, & intact 12/08/22 1145   Phlebitis Assessment 0 12/08/22 1145   Infiltration Assessment 0 12/08/22 1145   Dressing Status Clean, dry, & intact; New 12/08/22 1145   Dressing Type Transparent;Tape 12/08/22 1145   Hub Color/Line Status Pink;Patent; Infusing 12/08/22 1145        Opportunity for questions and clarification was provided.       Patient transported with:   Registered Nurse  Tech

## 2022-12-08 NOTE — PERIOP NOTES
TRANSFER - IN REPORT:    Verbal report received from CRNA and OR nurse(name[facility] for routine post - op      Report consisted of patients Situation, Background, Assessment and   Recommendations(SBAR). Information from the following report(s) SBAR, Kardex, OR Summary, Procedure Summary, Intake/Output, and MAR was reviewed with the receiving nurse. Opportunity for questions and clarification was provided. Assessment completed upon patients arrival to unit and care assumed.

## 2022-12-08 NOTE — PROGRESS NOTES
[facility] care of pt at this time. Assessment complete. Pt alert and oriented x 4. Shows no sign of distress. Fall risk arm band in place. Denies SOB and chest pain. Pt lungs clear bilaterally. Cap refill  less than 3 seconds. Pt denies numbness and tingling to all extremities. Stated pain 7/10. Pt has 18 G IV to R forearm. Pt has x5 lap sites with gauze  dressing and medipore tape CDI with raheel drain in place. scds and TEDs in place to BLE. Incentive spirometer at bedside IS 1500. Pt encouraged to continue use of IS. Pt verbalized understanding. Ice pack applied. Call light and possessions within reach. Bed locked and in low position. Will continue to monitor. 1435  Pt stated pain is 10/10 medicated with 6mg of IV morphine    1710  Pt ambulated in room to doorway x2 with nurse no complication.

## 2022-12-09 ENCOUNTER — APPOINTMENT (OUTPATIENT)
Dept: GENERAL RADIOLOGY | Age: 57
DRG: 621 | End: 2022-12-09
Attending: SPECIALIST
Payer: COMMERCIAL

## 2022-12-09 VITALS
TEMPERATURE: 98.8 F | OXYGEN SATURATION: 100 % | HEART RATE: 71 BPM | DIASTOLIC BLOOD PRESSURE: 56 MMHG | SYSTOLIC BLOOD PRESSURE: 134 MMHG | WEIGHT: 273.4 LBS | BODY MASS INDEX: 43.94 KG/M2 | HEIGHT: 66 IN | RESPIRATION RATE: 17 BRPM

## 2022-12-09 PROCEDURE — 74240 X-RAY XM UPR GI TRC 1CNTRST: CPT

## 2022-12-09 PROCEDURE — C9113 INJ PANTOPRAZOLE SODIUM, VIA: HCPCS | Performed by: SPECIALIST

## 2022-12-09 PROCEDURE — 74011250637 HC RX REV CODE- 250/637: Performed by: SPECIALIST

## 2022-12-09 PROCEDURE — 74011250636 HC RX REV CODE- 250/636: Performed by: SPECIALIST

## 2022-12-09 PROCEDURE — 74011000636 HC RX REV CODE- 636: Performed by: SPECIALIST

## 2022-12-09 PROCEDURE — 74011000250 HC RX REV CODE- 250: Performed by: SPECIALIST

## 2022-12-09 RX ORDER — OXYCODONE AND ACETAMINOPHEN 5; 325 MG/1; MG/1
1-2 TABLET ORAL
Status: DISCONTINUED | OUTPATIENT
Start: 2022-12-09 | End: 2022-12-09 | Stop reason: HOSPADM

## 2022-12-09 RX ORDER — OXYCODONE AND ACETAMINOPHEN 5; 325 MG/1; MG/1
1 TABLET ORAL
Qty: 28 TABLET | Refills: 0 | Status: SHIPPED | OUTPATIENT
Start: 2022-12-09 | End: 2022-12-16

## 2022-12-09 RX ORDER — ENOXAPARIN SODIUM 100 MG/ML
40 INJECTION SUBCUTANEOUS EVERY 12 HOURS
Qty: 20 EACH | Refills: 0 | Status: SHIPPED
Start: 2022-12-09 | End: 2022-12-19

## 2022-12-09 RX ORDER — ACETAMINOPHEN 325 MG/1
650 TABLET ORAL
Status: DISCONTINUED | OUTPATIENT
Start: 2022-12-09 | End: 2022-12-09 | Stop reason: HOSPADM

## 2022-12-09 RX ADMIN — SODIUM CHLORIDE 40 MG: 9 INJECTION, SOLUTION INTRAMUSCULAR; INTRAVENOUS; SUBCUTANEOUS at 10:31

## 2022-12-09 RX ADMIN — ACETAMINOPHEN 650 MG: 325 TABLET ORAL at 12:09

## 2022-12-09 RX ADMIN — SODIUM CHLORIDE, POTASSIUM CHLORIDE, SODIUM LACTATE AND CALCIUM CHLORIDE 150 ML/HR: 600; 310; 30; 20 INJECTION, SOLUTION INTRAVENOUS at 04:53

## 2022-12-09 RX ADMIN — MORPHINE SULFATE 6 MG: 4 INJECTION INTRAVENOUS at 01:50

## 2022-12-09 RX ADMIN — DIATRIZOATE MEGLUMINE AND DIATRIZOATE SODIUM 90 ML: 660; 100 LIQUID ORAL; RECTAL at 08:50

## 2022-12-09 RX ADMIN — KETOROLAC TROMETHAMINE 30 MG: 30 INJECTION, SOLUTION INTRAMUSCULAR; INTRAVENOUS at 04:53

## 2022-12-09 RX ADMIN — MORPHINE SULFATE 6 MG: 4 INJECTION INTRAVENOUS at 04:53

## 2022-12-09 RX ADMIN — ENOXAPARIN SODIUM 40 MG: 100 INJECTION SUBCUTANEOUS at 06:24

## 2022-12-09 NOTE — PROGRESS NOTES
1300  AVS review with pt, opportunity for questions and concerns at this time. D/c IV clean and dry. Properly discarded armbands.

## 2022-12-09 NOTE — PROGRESS NOTES
Chart reviewed. Admitted after elective surgical procedure. Low risk for readmission. [facility] [employer] [facility]. Anticipate discharge to home when medically cleared. Care management will continue to follow to assist with transition of care needs.     Aryan Pickering, MSN, RN, ACM-RN  Care Management  [phone]

## 2022-12-09 NOTE — ROUTINE PROCESS
Bedside and Verbal shift change report given to Wale Horta RN (oncoming nurse) by Jean-Paul Gresham RN (offgoing nurse). Report included the following information SBAR, Kardex, MAR and Recent Results.

## 2022-12-09 NOTE — NURSE NAVIGATOR
Patient sitting on side of bed sipping protein drink and tolerating well. Tylenol was ordered for a headache. Vitals:   Blood pressure (!) 134/56, pulse 71, temperature 98.8 °F (37.1 °C), resp. rate 17, height 5' 6\" (1.676 m), weight 124 kg (273 lb 6.4 oz), last menstrual period 09/12/2022, SpO2 100 %. Output: reviewed, and patient verified urinating clear, yellow urine. Pulmonary: Clear in all lobes  Cardiac: Regular rate and rhythm  Abdomen: Bowel sounds hypo-active x4. Lap sites without erythema, swelling,  and/or drainage. SWETA drain with a small amount of serosanguinous drainage. SCD's: Positioned and operating WNL    Patient with expected pain, but is being managed and is currently tolerable. No nausea and/or vomiting. Patient has been ambulating the halls. Patient has not had the opportunity to swallow pills. Post-op diet progression discussed with patient. Patient to be discharged on a bariatric full liquid diet. Patient verbalized understanding of liquid diet for next two weeks until first post-op visit. Goal of four ounces per hour with one ounce being protein was clearly understood. Medications were discussed (i.e., pain- Percocet, Tylenol, not to use aspirin or ibuprofen based products, as well as steroids). Constipation was discussed and ways to alleviate it were discussed. Education completed on IS use and to ambulate every hour when at home. Patient completed a return demonstration on IS with no issues or concerns from this RN. Lovenox and Percocet filled and in the home. Lovenox and Percocet education provided, and patient verbalized understanding. Patient has chewable multi-vitamin, probiotic, and Prilosec in the home; they were reviewed. Ketosis was also reviewed. Patient given a report card to record intake and a handout to support bedside education. All questions were answered by this RN, and patient verbalized understanding to all education provided.   Goals for discharge were discussed, and patient verbalized understanding. Post-op follow-up appt. evon scheduled.

## 2022-12-09 NOTE — PROGRESS NOTES
1925 - Assumed care at this time. 1941 - Patient A&Ox4, RA. Denies chest pain and SOB. Pt getting up to 1000 on IS. Denies nausea/ denies vomiting. Denies flatus, pt denies belching. SCD compression device and TEDs bilaterally. x5 lap sites with medipore tape dressings to abdomen C/D/I. SWETA draining and patent, dressing to SWETA has small amount of breakthrough drainage. Brown draining and patent. Denies numbness/tingling/calf pain. Pain 8/10 with a tolerable level of 6/10, pain medication administered per MAR. Pt educated on IS use, q2h rounds, importance of ambulation, pain management, and 5am being the last dose of narcotics prior to GI study. Pt verbalized understanding, no concerns voiced. Call bell within reach, bed in lowest position. Pt encouraged to call for assistance. 2314 - Pt ambulated in hallway. Per pt she is now belching.

## 2022-12-09 NOTE — DISCHARGE INSTRUCTIONS
[facility]  Jake Michael M.D., F.A.C.S.  [address] [place] [address] Drive. [address] [place] [address] Rd, [address] [facility]  Office: [phone]    Fax:  [phone]    Discharge Instruction for Gastric Bypass / Sleeve Gastrectomy Patients    Diet:  Continue with the liquid diet until you are seen in the office. Make sure you sip fluids all day. Aim for  Gms of protein every day. Activity:  Walking is encouraged. Rest when you are tired. You may shower. You may climb stairs. Take your time. No lifting more than 10-15 lbs. If you feel discomfort during an activity, rest.  Do not drive for 1 week. Wound Care:  Clean incisions with soap and water when in the shower. Pat dry. Leave steri-strips on until they fall off. A small amount of drainage may be present from the incisions. Contact the office if you notice an increase in drainage, an odor, increased redness, or fever > 100.5. Medications: You will receive a prescription for pain medication at the time of discharge. For upset stomach you may take over the counter medications such as Maalox, Mylanta, Pepcid, Zantac, or Tagamet. You may take Tylenol  Non-aspirin based arthritis medications may be resumed after the first month. Take a childrens or adult chewable multivitamin. Milk of Magnesia will help with constipation. It is fine to take your usual home medications. Blood pressure medications should be continued after surgery. Diabetic medications can frequently be reduced very quickly after surgery. Diabetics should continue to monitor blood sugars frequently after surgery and contact the prescribing physician for any questions. Follow-Up Appointment:  If you do not already have a follow-up appointment scheduled, contact the office in the next few days to obtain one. It is usually scheduled for 10-14 days after you surgery date. Office phone number:  [phone].         REV  09/2010

## 2022-12-09 NOTE — PROGRESS NOTES
Bariatric Surgery                POD #1    Visit Vitals  BP (!) 134/56   Pulse 71   Temp 98.8 °F (37.1 °C)   Resp 17   Ht 5' 6\" (1.676 m)   Wt 124 kg (273 lb 6.4 oz)   LMP 09/12/2022   SpO2 100%   BMI 44.13 kg/m²     Patient has minimal complaints of pain, minimal nausea noted     Exam:  Appears well in no distress  Lungs- clear bilaterally  Abd - soft, incisions look good without erythema           SWETA with minimal serosanguinous output  Extremities- no new edema or swelling    UGI - pending    Data Review:    Labs: Results:       Chemistry No results for input(s): GLU, NA, K, CL, CO2, BUN, CREA, CA, AGAP, BUCR, TBIL, AP, TP, ALB, GLOB, AGRAT in the last 72 hours. No lab exists for component: GPT   CBC w/Diff No results for input(s): WBC, RBC, HGB, HCT, PLT, GRANS, LYMPH, EOS, RETIC, HGBEXT, HCTEXT, PLTEXT in the last 72 hours. Coagulation No results for input(s): PTP, INR, APTT, INREXT in the last 72 hours. Liver Enzymes No results for input(s): TP, ALB, TBIL, AP in the last 72 hours. No lab exists for component: SGOT, GPT, DBIL       Assessment/Plan: S/P  laparoscopic gastric bypass surgery - doing well without any issues    1. Start bariatric diet and protein shakes after UGI  2. D/C IV pain meds and start PO pain meds  3. D/C SWETA drain  4. Likley PM D/C if  Cont ok and tolerate PO

## 2022-12-09 NOTE — ROUTINE PROCESS
Bedside and Verbal shift change report given to Ramón Golden RN by Meg Dotson RN. Report included the following information SBAR, Kardex, Intake/Output and MAR.

## 2022-12-09 NOTE — DISCHARGE SUMMARY
Discharge Summary    Patient: Junior Rodriguez               Sex: female          DOA: 12/8/2022         YOB: 1965      Age:  56 y.o.        LOS:  LOS: 1 day                Admit Date: 12/8/2022    Discharge Date: 12/9/2022    Admission Diagnoses: Morbid obesity with BMI of 40.0-44.9, adult ([facility]) [E66.01, Z68.41]  GERD (gastroesophageal reflux disease) [K21.9]    Discharge Diagnoses:    Problem List as of 12/9/2022 Date Reviewed: 6/6/2022            Codes Class Noted - Resolved    Morbid obesity with BMI of 40.0-44.9, adult ([facility] ICD-10-CM: E66.01, Z68.41  ICD-9-CM: 278.01, V85.41  12/8/2022 - Present        GERD (gastroesophageal reflux disease) ICD-10-CM: K21.9  ICD-9-CM: 530.81  11/28/2022 - Present        Left foot pain ICD-10-CM: B36.526  ICD-9-CM: 729.5  Unknown - Present        Intestinal malabsorption ICD-10-CM: K90.9  ICD-9-CM: 579.9  6/6/2022 - Present        S/P laparoscopic sleeve gastrectomy ICD-10-CM: Z98.84  ICD-9-CM: V45.86  6/6/2022 - Present        Vitamin D deficiency ICD-10-CM: E55.9  ICD-9-CM: 268.9  Unknown - Present        Morbid obesity with BMI of 45.0-49.9, adult ([facility] ICD-10-CM: E66.01, Z68.42  ICD-9-CM: 278.01, V85.42  Unknown - Present        Anemia ICD-10-CM: D64.9  ICD-9-CM: 285. 9  Unknown - Present        DJD (degenerative joint disease) ICD-10-CM: M19.90  ICD-9-CM: 715.90  Unknown - Present    Overview Signed 6/6/2022  8:59 AM by Kasie Krishna NP     knees, hips, feet, back             Chronic back pain ICD-10-CM: M54.9, G89.29  ICD-9-CM: 724.5, 338.29  Unknown - Present        Morbid obesity ([facility] ICD-10-CM: E66.01  ICD-9-CM: 278.01  Unknown - Present        Arthritis of both knees ICD-10-CM: M17.0  ICD-9-CM: 716.96  Unknown - Present        Asthma ICD-10-CM: J45.909  ICD-9-CM: 493.90  Unknown - Present        RESOLVED: Morbid obesity with BMI of 50.0-59.9, adult ([facility] ICD-10-CM: E66.01, Z68.43  ICD-9-CM: 278.01, V85.43  Unknown - 6/6/2022           Discharge Condition: Good    Hospital Course: The patient underwent  laparoscopic gastric bypass surgery  on 12/8/2022. The patient tolerated the procedure well. Vital signs remained stable and the patient was transferred to  3rd floor surgical unit without complications. The patient remained stable throughout the first night post operatively with stable vital signs and adequate urine output and pain control. Pain was controlled with Dilaudid IV and IV Tylenol . The patient on the first morning post operative was transferred to the radiology suite where they underwent a gastrograffin UGI study which showed no evidence of a leak or stricture. The drain was discontinued on POD # 1 and the patient was started on a bariatric liquid diet with protein shakes. The patient progressed throughout the day and was ambulating well and tolerating their diet. They were therefore discharged home with instructions to notify me with any issues that may arise. Significant Diagnostic Studies:   No results for input(s): HGB, HGBEXT in the last 72 hours. No results for input(s): HCT, HCTEXT in the last 72 hours. Current Discharge Medication List        CONTINUE these medications which have CHANGED    Details   enoxaparin (LOVENOX) 40 mg/0.4 mL 0.4 mL by SubCUTAneous route every twelve (12) hours for 10 days. Indications: deep vein thrombosis prevention  Qty: 20 Each, Refills: 0  Start date: 12/9/2022, End date: 12/19/2022           CONTINUE these medications which have NOT CHANGED    Details   diclofenac (VOLTAREN) 1 % gel APPLY 1 GRAM TO THE AFFFECTED AREA(S) TWICE A DAY FOR 30 DAYS EXTERNAL TWICE A DAY 30 DAYS      albuterol (PROVENTIL VENTOLIN) 2.5 mg /3 mL (0.083 %) nebu by Nebulization route. ondansetron (ZOFRAN ODT) 8 mg disintegrating tablet Take 1 Tablet by mouth every eight (8) hours as needed for Nausea or Vomiting.   Qty: 30 Tablet, Refills: 0           STOP taking these medications       Cyclobenzaprine 7.5 mg tablet Comments:   Reason for Stopping:         ergocalciferol (ERGOCALCIFEROL) 1,250 mcg (50,000 unit) capsule Comments:   Reason for Stopping:         naproxen (NAPROSYN) 500 mg tablet Comments:   Reason for Stopping:               Activity: activity as tolerated with no heavy lifting of greater than 20 pounds. No anti- inflammatory medications. Use stool softeners at home as needed while taking pain medications since they are constipating. Diet: Bariatric liquid diet    Wound Care: Keep wound clean and dry, Reinforce dressing PRN and ice to area for comfort. Do not get wound wet for 2 days.     Follow-up: 14 days with Dr Dennis Gray M.D

## 2022-12-12 ENCOUNTER — TELEPHONE (OUTPATIENT)
Dept: SURGERY | Age: 57
End: 2022-12-12

## 2022-12-12 NOTE — TELEPHONE ENCOUNTER
This RN spoke with patient post-operatively. Hydration: Patient hydrated with 32 ounces as of yesterday. She has hydrated with 22 ounces as of 0630 thus far today. Nausea and/or vomitting: None    Pain: Currently managed with Percocet at night as needed or Tylenol during the day    Lovenox injections: Administering every 12 hours, rotating sites. RN reminded patient to complete all injections, in which patient verbalized understanding. Lap sites: No erythema, drainage, and/or swelling    SWETA drain site: No drainage; dressing being changed     BM: Two since surgery    Ambulation: Patient is walking throughout house every hour. IS: Patient continues to use 10x's per hour while awake. She has reached 1,750 mL. Temperature: 98.4 degrees    Medications: (confirmed currently taking)   *Multi-vitamin: yes   *Probiotic: yes   *Prilosec: yes    Questions: None    This RN reminded the patient to contact the office with any questions and/or concerns. RN also reminded patient they will receive another follow-up TC prior to the two week post-op follow-up appointment. Patient verbalized understanding to both. Patient's two week post-op visit is scheduled and was confirmed.

## 2022-12-13 ENCOUNTER — TELEPHONE (OUTPATIENT)
Dept: SURGERY | Age: 57
End: 2022-12-13

## 2022-12-13 NOTE — TELEPHONE ENCOUNTER
This RN spoke with patient post-operatively. Hydration: Patient hydrated with 42 ounces as of yesterday. Thus far today, she has hydrated with 40 ounces. Nausea and/or vomitting: None     Pain: Currently managed with Percocet at night as needed or Tylenol during the day     Lovenox injections: Administering every 12 hours, rotating sites. RN reminded patient to complete all injections, in which patient verbalized understanding. Lap sites: No erythema, drainage, and/or swelling     SWETA drain site: No drainage; dressing being changed      BM: Two since surgery     Ambulation: Patient is walking throughout house every hour. IS: Patient continues to use 10x's per hour while awake. She has reached 1,750 mL. Temperature: 98.4 degrees     Medications: (confirmed currently taking)              *Multi-vitamin: yes              *Probiotic: yes              *Prilosec: yes     Questions: None     This RN reminded the patient to contact the office with any questions and/or concerns. RN also reminded patient they will receive another follow-up TC prior to the two week post-op follow-up appointment. Patient verbalized understanding to both. Patient's two week post-op visit is scheduled and was confirmed.

## 2022-12-20 ENCOUNTER — HOSPITAL ENCOUNTER (OUTPATIENT)
Dept: BARIATRICS/WEIGHT MGMT | Age: 57
Discharge: HOME | End: 2022-12-20

## 2022-12-20 NOTE — PROGRESS NOTES
Spoke with Enoc Mac  today. Pt is approx. 2 weeks S/P  Conversion of sleeve to LGBP with NELI. procedure. Tolerating liquid diet very well. Protein shake intake: 3 Fairlife/ Premier protein supplement shakes/day. Fluid intake: 50-52 oz/day. Foods being consumed include sf jello, wonton soup (strained), decaf sf tea (orange spice/cider), and broth. No complaints. Wt: 262 lbs    Pre-op Wt: 272 lbs    EBW: 117  lbs   9 % EBWL    Activity: Pt states she has been walking for ADLs, at her work (at [employer]), as well as laps around the sanctuary at [employer], daily x 5 d/wk    Supplements:  [x] [place]'s Complete Chewable MVI BID  [x] Probiotic QD  [x]Prilosec QD    Pt given one on one diet education over the phone. Reviewed diet progression for weeks 3-4. Patient appears to have a good understanding of the diet progression, food choices, and dietary/exercise habits for successful weight loss and nourishment after surgery. Reviewed pp. 32-45 of the patient education book. Discussed: post-op diet progression, including soft/pureed high protein, low fat, low sugar food recommendations; proper food group choices;  consumption of food and liquids, and consumption of adequate no-sugar, no-caffeine, no carbonation liquids. We reviewed appropriate food choices, meal adaptations (use of smaller dishes/utensils, eat slowly and chewing sufficiently for digestion), cooking techniques, and eating behavior modifications. Discussed intake regimen with 3 meals and 2-3 protein supplements per day. Additionally, intake timing - to include consuming a meal or snack every 3-4 hrs, the 30:30 rule, and having a meal within 2 hours of waking . Reinforced the importance of continued vitamin & probiotic consumption, adequate fluid with goal of 64 oz per day and adequate protein with goal of  grams per day.  Stressed the importance of 30 min of physical activity each day, as tolerated, with restricted lifting, to improve post op weight loss results and bowel function. Pt voiced understanding through repeating the information back to me. All pt nutrition-related questions answered. Reminded pt of their appointment for their 2 week post-op medical evaluation  on 12/21/22   at  11:30 am.  Additionally reminded pt that a RD would be calling them again at their 1 mo. post-op bala. Pt provided with RD contact information for nutritional questions or concerns between now and then.     RD: Juan Ramirez MS, RD

## 2022-12-21 ENCOUNTER — OFFICE VISIT (OUTPATIENT)
Dept: SURGERY | Age: 57
End: 2022-12-21
Payer: COMMERCIAL

## 2022-12-21 VITALS
DIASTOLIC BLOOD PRESSURE: 62 MMHG | OXYGEN SATURATION: 99 % | WEIGHT: 266 LBS | TEMPERATURE: 98.2 F | HEIGHT: 66 IN | HEART RATE: 75 BPM | BODY MASS INDEX: 42.75 KG/M2 | SYSTOLIC BLOOD PRESSURE: 123 MMHG

## 2022-12-21 DIAGNOSIS — Z09 POSTOPERATIVE EXAMINATION: Primary | ICD-10-CM

## 2022-12-21 PROCEDURE — 99024 POSTOP FOLLOW-UP VISIT: CPT | Performed by: NURSE PRACTITIONER

## 2022-12-21 RX ORDER — CHOLECALCIFEROL (VITAMIN D3) 50 MCG
CAPSULE ORAL
COMMUNITY

## 2022-12-21 RX ORDER — PHENOL/SODIUM PHENOLATE
20 AEROSOL, SPRAY (ML) MUCOUS MEMBRANE DAILY
COMMUNITY

## 2022-12-21 NOTE — PATIENT INSTRUCTIONS
Continue focus on hydration. May advance to soft diet. Please review material in your binder. Remember - your motto is \"soft foods with protein\". Eat regularly. Continue to use protein shakes. Remember to eat slowly & not to drink fluids with your meals. Increase activity as able - cardio (walking) only. Continue to take multi-vitamins. Continue regular follow-up with your PCP. Return to office as scheduled for your next appointment. Call the office if you have any questions or concerns. If you still have your gallbladder, you will be mailed a medication to help prevent developing gallstones. DO NOT start taking until after your 1 month postop phone call with the dietitian.

## 2022-12-21 NOTE — PROGRESS NOTES
Subjective: Erick Ashley is a 56 y.o. female is now 13 days status post  laparoscopic conversion of sleeve gastrectomy to gastric bypass . Doing well overall. She has lost a total of 6 pounds since surgery. Body mass index is 42.93 kg/m². Currently on a liquid diet without difficulty. Taking in 50-52 oz fluid daily. Sources of protein include protein shakes. No structured exercise, but increasing activity. Bowel movements are regular. The patient is not having any pain. . The patient is compliant with multivitamins. Surgery related complications: NA.  Liver bx report reviewed with patient. Stomach bx report reviewed with patient. Weight Loss Metrics 12/21/2022 12/8/2022 12/1/2022 11/28/2022 8/25/2022 7/20/2022 6/30/2022   Today's Wt 266 lb 273 lb 6.4 oz 272 lb 272 lb 268 lb 6.4 oz 275 lb 4.8 oz 274 lb 3.2 oz   BMI 42.93 kg/m2 44.13 kg/m2 43.9 kg/m2 43.9 kg/m2 43.32 kg/m2 44.43 kg/m2 44.26 kg/m2          Comorbidities:    Hypertension: not applicable  Diabetes: not applicable  Obstructive Sleep Apnea: not applicable  Hyperlipidemia: not applicable  Stress Urinary Incontinence: not applicable  Gastroesophageal Reflux: improved, on PPI  Weight related arthropathy:unchanged    Denies diagnosis of COVID-19 since surgery.      Patient Active Problem List   Diagnosis Code    Chronic back pain M54.9, G89.29    Morbid obesity ([facility]) E66.01    Arthritis of both knees M17.0    Asthma J45.909    Left foot pain M79.672    Intestinal malabsorption K90.9    S/P laparoscopic sleeve gastrectomy Z98.84    Vitamin D deficiency E55.9    Morbid obesity with BMI of 45.0-49.9, adult ([facility]) E66.01, Z68.42    Anemia D64.9    DJD (degenerative joint disease) M19.90    GERD (gastroesophageal reflux disease) K21.9    Morbid obesity with BMI of 40.0-44.9, adult [facility] E66.01, Z68.41        Past Medical History:   Diagnosis Date    Anemia     Arthritis of both knees     Asthma     acute, maybe use inhaler Q 6 months    Chronic back pain     back & joint pain    DJD (degenerative joint disease)     knees, hips, feet, back    GERD (gastroesophageal reflux disease)     Intestinal malabsorption 06/06/2022    Morbid obesity ([facility]     Morbid obesity with BMI of 45.0-49.9, adult ([facility]     S/P laparoscopic sleeve gastrectomy 07/27/2020    Vitamin D deficiency        Past Surgical History:   Procedure Laterality Date    HX ENDOSCOPY      HX HERNIA REPAIR  1977    bilat inguinal    HX LAP GASTRIC BYPASS  12/08/2022    Dr Meg Salazar, conversion    HX ORTHOPAEDIC Left 12/19/2016    excision of planter fibroma left foot    AR LAP, ULISES RESTRICT PROC, LONGITUDINAL GASTRECTOMY  07/27/2020    Dr Maryellen Lott       Current Outpatient Medications   Medication Sig Dispense Refill    B.animalis,bifid,infantis,long (Probiotic 4X) 10-15 mg TbEC Take  by mouth.      multivitamin with iron (FLINTSTONES) chewable tablet Take 1 Tablet by mouth daily. Omeprazole delayed release (PRILOSEC D/R) 20 mg tablet Take 20 mg by mouth daily. albuterol (PROVENTIL VENTOLIN) 2.5 mg /3 mL (0.083 %) nebu by Nebulization route. ondansetron (ZOFRAN ODT) 8 mg disintegrating tablet Take 1 Tablet by mouth every eight (8) hours as needed for Nausea or Vomiting.  30 Tablet 0    diclofenac (VOLTAREN) 1 % gel APPLY 1 GRAM TO THE AFFFECTED AREA(S) TWICE A DAY FOR 30 DAYS EXTERNAL TWICE A DAY 30 DAYS         Allergies   Allergen Reactions    Penicillin G Hives     Swelling    Penicillins Hives and Itching    Stings [Sting, Bee] Hives     Swelling ,shortness of breath       Review of Symptoms:       General - No history or complaints of unexpected fever or chills  Head/Neck - No history or complaints of headache or dizziness  Cardiac - No history or complaints of chest pain, palpitations, or shortness of breath  Pulmonary - No history or complaints of shortness of breath or productive cough  Gastrointestinal - as noted above  Genitourinary - No history or complaints of hematuria/dysuria or renal lithiasis  Musculoskeletal - No history or complaints of joint  muscular weakness  Hematologic - No history of any bleeding episodes  Neurologic - No history or complaints of  migraine headaches or neurologic symptoms        Objective:     Visit Vitals  /62   Pulse 75   Temp 98.2 °F (36.8 °C)   Ht 5' 6\" (1.676 m)   Wt 120.7 kg (266 lb)   SpO2 99%   BMI 42.93 kg/m²       General:  alert, cooperative, no distress, appears stated age   Chest: lungs clear to auscultation, breath sounds equal and symmetric, no rhonchi, rales or wheezes, no accessory muscle use   Cor:   Regular rate and rhythm, S1S2 present, or without murmur or extra heart sounds   Abdomen: soft, bowel sounds active, non-tender, no masses or organomegaly   Incisions:   healing well, no drainage, no erythema, no hernia, no seroma, no swelling, no dehiscence, incision well approximated       Assessment:   History of Morbid obesity, status post laparoscopic gastric bypass surgery. Doing well postoperatively. Plan:     Increase activity to the goal of 30 minutes daily and Increase fluids  Advance diet to soft solid phase. Reminded to measure portions, continue high protein, low carbohydrate diet. Reminded to eat regularly, to eat slowly & not to drink with meals. Refer to the handbook given in class. Start ursodiol at 1 month postop for cholelithiasis prevention if not had cholecystectomy. Stop after 6 months out from surgery. Continue multivitamin   Continue current medications and follow up with PCP for management of regimen. Encouraged to attend support group   I have discussed this plan with patient and they verbalized understanding  Follow up in 2 weeks or sooner if patient has questions, concerns or worsening of condition, if unable to reach our office, patient should report to the ED.                                                                                Ms. Akilah Wilson has a reminder for a \"due or due soon\" health maintenance. I have asked that she contact her primary care provider for a follow-up on this health maintenance.

## 2023-01-05 ENCOUNTER — TELEPHONE (OUTPATIENT)
Dept: BARIATRICS/WEIGHT MGMT | Age: 58
End: 2023-01-05

## 2023-01-05 NOTE — TELEPHONE ENCOUNTER
Pt called today re: issues with the tolerance of her soft/pureed diet. Pt states that she is able to consume 64 oz of fluids/day, and is drinking 3-4 Premier/Fairlife protein shakes per day. Pt states that she has been able to tolerate crab and tuna, however when she eats scrambled eggs she has a sensation of them getting \"stuck in [her] chest\". Pt reports she follows the 30/30 Rule, is measuring 1 oz portions and is taking \"tiny bites\", no larger than a dime. Pt states she is currently chewing her food to a puree, although has tried blending it as well. Recommended that pt:  Put all meat proteins in a  and puree them  Ensure that all meat proteins are very moist/tender before eating  Try only adding seafood meat protein until next nutrition appt. , scheduled for 1/9/23. All pt nutrition-related questions answered. Pt was encouraged to call with any nutrition-related questions or concerns prior to next appt. Evaluate pt diet tolerance and possibly slow progression of variety of meat proteins at next appt.     RD: Kishore Venegas, MS, RD

## 2023-01-09 ENCOUNTER — TELEPHONE (OUTPATIENT)
Dept: SURGERY | Age: 58
End: 2023-01-09

## 2023-01-09 ENCOUNTER — HOSPITAL ENCOUNTER (OUTPATIENT)
Dept: BARIATRICS/WEIGHT MGMT | Age: 58
Discharge: HOME | End: 2023-01-09

## 2023-01-09 VITALS — HEIGHT: 66 IN | WEIGHT: 254 LBS | BODY MASS INDEX: 40.82 KG/M2

## 2023-01-09 RX ORDER — URSODIOL 200 MG/1
CAPSULE ORAL
Qty: 90 EACH | Refills: 4 | Status: SHIPPED | OUTPATIENT
Start: 2023-01-09

## 2023-01-09 NOTE — TELEPHONE ENCOUNTER
PA received for Reltone and was denied. Prior Authorization initiated through cover my meds for Reltone awaiting determination.      Reltone sent into [facility] per Ashley Sandhu NP

## 2023-01-09 NOTE — PROGRESS NOTES
Patient is a 56 y.o.  female approx. 1 mo S/P Conversion of sleeve to LGBP with NELI procedure. Visit Vitals  Ht 5' 6\" (1.676 m)   Wt 115.2 kg (254 lb)   BMI 41.00 kg/m²       Pt current weight stated, visit was virtual.     Pre-op Wt: 272 lbs  EBW: 117 lbs    Pt has lost 18 lbs since surgery on 12/8/2022. Pt has lost 15 % EBW. Pt is currently on a soft food diet without difficulty. Patient is hydrating with  50-55  ounces just water and sugar-free beverages, daily, not including protein supplement intake. Patient is tolerating the following sources of protein:  4-5 Fairlife/Premier protein supplement shakes/day, gisela fish, scallops, and tuna. Patient is exercising by attending a [facility] weight loss group exercise program for 30 min/day  x 3 d/wk. Also, 15 min of  [employer] videos /day x 2 d/wk. Patient [] has  [x] has not had any readmissions, reoperations, complications, ED visits, nor IVF at [facility] during her first post-op month. Pt [x]is []is not taking her Prilosec. Supplements:  [x] [place]'s Complete MVI  BID  [x] Probiotic    Reviewed the following with patient:  Advance diet to solid phase. Reminded to measure portions, continue high protein, low carbohydrate diet. Reminded to eat regularly, to eat slowly & not to drink with meals. Refer to the handbook and video. Continue multi-vitamin with iron and add the following supplements  (as listed in handbook):  Calcium citrate: 1,500 mg/d, taken in doses of 500 mg TID, 2 hours apart from previous calcium citrate doses and MVI doses  Vitamin B-complex: follow directions on supplement, 1-2 capsules daily. Vitamin B12: 1,000 mcg daily, taken sublingually   Vitamin D3: 3,000 IU per day  Can stop probiotic, if desired, or needed for economical reasons. Continue cardio exercise and add resistance exercises. Discussed with patient. Minimum of 150 min vigorous activity/week recommended.   Encouraged to attend monthly support group. Start Reltone Ursodiol as soon as delivered, as directed at 2 wk post-op appt,  stop after all refills completed and pt is completely out of medication, approx. 6 mos post-op. Continue current medications and follow up with PCP for management of regimen. I have discussed this plan with patient, and they verbalized understanding. One-month nutrition video to include the above mentioned education and link for support group e-mailed to patient. RD contact information provided for nutrition-related questions. Follow-up with provider at three-month appointment on 3/15/2023 at  10:00 am, or sooner if patient has questions, concerns or worsening of condition. If unable to reach our office, patient should report to the ED. Patient received the nutrition educational folder to include the above mentioned education during their two week post-op appointment in the clinic. All current stated questions answered. Pt has been provided RD contact information for future nutrition-related questions or concerns.     RD: Donaldo Lee MS, CHUNG

## 2023-01-10 RX ORDER — OMEPRAZOLE 20 MG/1
CAPSULE, DELAYED RELEASE ORAL
Qty: 30 CAPSULE | Refills: 1 | Status: SHIPPED | OUTPATIENT
Start: 2023-01-10

## 2023-03-15 RX ORDER — ONDANSETRON 8 MG/1
TABLET, ORALLY DISINTEGRATING ORAL
Qty: 8 TABLET | Refills: 3 | Status: SHIPPED | OUTPATIENT
Start: 2023-03-15

## 2023-03-15 RX ORDER — OMEPRAZOLE 20 MG/1
CAPSULE, DELAYED RELEASE ORAL
Qty: 30 CAPSULE | Refills: 1 | Status: SHIPPED | OUTPATIENT
Start: 2023-03-15

## 2023-04-20 ENCOUNTER — HOSPITAL ENCOUNTER (OUTPATIENT)
Facility: HOSPITAL | Age: 58
Discharge: HOME | End: 2023-04-20
Payer: COMMERCIAL

## 2023-04-20 ENCOUNTER — OFFICE VISIT (OUTPATIENT)
Age: 58
End: 2023-04-20
Payer: COMMERCIAL

## 2023-04-20 VITALS
HEART RATE: 88 BPM | DIASTOLIC BLOOD PRESSURE: 66 MMHG | WEIGHT: 237.8 LBS | HEIGHT: 66 IN | SYSTOLIC BLOOD PRESSURE: 144 MMHG | OXYGEN SATURATION: 100 % | TEMPERATURE: 97.3 F | BODY MASS INDEX: 38.22 KG/M2

## 2023-04-20 DIAGNOSIS — D64.9 ANEMIA, UNSPECIFIED TYPE: ICD-10-CM

## 2023-04-20 DIAGNOSIS — E55.9 VITAMIN D DEFICIENCY: ICD-10-CM

## 2023-04-20 DIAGNOSIS — K90.9 INTESTINAL MALABSORPTION, UNSPECIFIED TYPE: Primary | ICD-10-CM

## 2023-04-20 DIAGNOSIS — K90.9 INTESTINAL MALABSORPTION, UNSPECIFIED TYPE: ICD-10-CM

## 2023-04-20 DIAGNOSIS — Z98.84 S/P GASTRIC BYPASS: ICD-10-CM

## 2023-04-20 LAB
ALBUMIN SERPL-MCNC: 3.8 G/DL (ref 3.4–5)
ALBUMIN/GLOB SERPL: 1.1 (ref 0.8–1.7)
ALP SERPL-CCNC: 261 U/L (ref 45–117)
ALT SERPL-CCNC: 89 U/L (ref 13–56)
ANION GAP SERPL CALC-SCNC: 2 MMOL/L (ref 3–18)
AST SERPL-CCNC: 46 U/L (ref 10–38)
BASOPHILS # BLD: 0 K/UL (ref 0–0.1)
BASOPHILS NFR BLD: 1 % (ref 0–2)
BILIRUB SERPL-MCNC: 0.6 MG/DL (ref 0.2–1)
BUN SERPL-MCNC: 9 MG/DL (ref 7–18)
BUN/CREAT SERPL: 12 (ref 12–20)
CALCIUM SERPL-MCNC: 9.5 MG/DL (ref 8.5–10.1)
CHLORIDE SERPL-SCNC: 111 MMOL/L (ref 100–111)
CO2 SERPL-SCNC: 29 MMOL/L (ref 21–32)
CREAT SERPL-MCNC: 0.74 MG/DL (ref 0.6–1.3)
DIFFERENTIAL METHOD BLD: ABNORMAL
EOSINOPHIL # BLD: 0.3 K/UL (ref 0–0.4)
EOSINOPHIL NFR BLD: 4 % (ref 0–5)
ERYTHROCYTE [DISTWIDTH] IN BLOOD BY AUTOMATED COUNT: 16.5 % (ref 11.6–14.5)
GLOBULIN SER CALC-MCNC: 3.6 G/DL (ref 2–4)
GLUCOSE SERPL-MCNC: 86 MG/DL (ref 74–99)
HCT VFR BLD AUTO: 37.3 % (ref 35–45)
HGB BLD-MCNC: 11.6 G/DL (ref 12–16)
IMM GRANULOCYTES # BLD AUTO: 0 K/UL (ref 0–0.04)
IMM GRANULOCYTES NFR BLD AUTO: 0 % (ref 0–0.5)
LYMPHOCYTES # BLD: 2.2 K/UL (ref 0.9–3.6)
LYMPHOCYTES NFR BLD: 36 % (ref 21–52)
MCH RBC QN AUTO: 25.8 PG (ref 24–34)
MCHC RBC AUTO-ENTMCNC: 31.1 G/DL (ref 31–37)
MCV RBC AUTO: 83.1 FL (ref 78–100)
MONOCYTES # BLD: 0.4 K/UL (ref 0.05–1.2)
MONOCYTES NFR BLD: 6 % (ref 3–10)
NEUTS SEG # BLD: 3.3 K/UL (ref 1.8–8)
NEUTS SEG NFR BLD: 53 % (ref 40–73)
NRBC # BLD: 0 K/UL (ref 0–0.01)
NRBC BLD-RTO: 0 PER 100 WBC
PLATELET # BLD AUTO: 179 K/UL (ref 135–420)
POTASSIUM SERPL-SCNC: 4 MMOL/L (ref 3.5–5.5)
PROT SERPL-MCNC: 7.4 G/DL (ref 6.4–8.2)
RBC # BLD AUTO: 4.49 M/UL (ref 4.2–5.3)
SODIUM SERPL-SCNC: 142 MMOL/L (ref 136–145)
WBC # BLD AUTO: 6.2 K/UL (ref 4.6–13.2)

## 2023-04-20 PROCEDURE — 80053 COMPREHEN METABOLIC PANEL: CPT

## 2023-04-20 PROCEDURE — 82728 ASSAY OF FERRITIN: CPT

## 2023-04-20 PROCEDURE — 82746 ASSAY OF FOLIC ACID SERUM: CPT

## 2023-04-20 PROCEDURE — 36415 COLL VENOUS BLD VENIPUNCTURE: CPT

## 2023-04-20 PROCEDURE — 85025 COMPLETE CBC W/AUTO DIFF WBC: CPT

## 2023-04-20 PROCEDURE — 84425 ASSAY OF VITAMIN B-1: CPT

## 2023-04-20 PROCEDURE — 83540 ASSAY OF IRON: CPT

## 2023-04-20 PROCEDURE — 82306 VITAMIN D 25 HYDROXY: CPT

## 2023-04-20 PROCEDURE — 99214 OFFICE O/P EST MOD 30 MIN: CPT | Performed by: NURSE PRACTITIONER

## 2023-04-20 PROCEDURE — 82607 VITAMIN B-12: CPT

## 2023-04-20 RX ORDER — VITAMIN B COMPLEX
1 CAPSULE ORAL DAILY
COMMUNITY

## 2023-04-20 RX ORDER — ERGOCALCIFEROL 1.25 MG/1
50000 CAPSULE ORAL WEEKLY
COMMUNITY

## 2023-04-20 NOTE — PATIENT INSTRUCTIONS
other important nutrients and also have added sugar. Continue with chewable vitamin or change to adult complete multivitamin one month after surgery. Menstruating women can take a prenatal vitamin. Make sure has at least 18 mg iron and 134-415 mcg folic acid   Vitamin Q71, B Complex Vitamin, and Biotin  Start taking within a month after surgery. Vitamin B12:  1000 mcg of Vitamin B12 three times weekly    Must take sublingually (meaning you take it under your tongue) or in a liquid drop form for easy absorption. B Complex Vitamin: Take a pill or liquid drop form once daily. Biotin: This vitamin can help prevent hair loss.     Recommend 5mg   (5000 mcg) a day  Biotin is Optional

## 2023-04-20 NOTE — PROGRESS NOTES
Anemia     Arthritis of both knees     Asthma     acute, maybe use inhaler Q 6 months    Chronic back pain     back & joint pain    DJD (degenerative joint disease)     knees, hips, feet, back    GERD (gastroesophageal reflux disease)     Intestinal malabsorption 06/06/2022    Morbid obesity ([facility]     Morbid obesity with BMI of 45.0-49.9, adult ([facility]     S/P laparoscopic sleeve gastrectomy 07/27/2020    Vitamin D deficiency        Past Surgical History:   Procedure Laterality Date    GASTRIC BYPASS SURGERY  12/08/2022    Dr Jo Cordero, conversion    HERNIA REPAIR  1977    bilat inguinal    LAP, FERMIN RESTRICT PROC, LONGITUDINAL GASTRECTOMY  07/27/2020    Dr Walker Lasso Left 12/19/2016    excision of planter fibroma left foot    UPPER GASTROINTESTINAL ENDOSCOPY         Current Outpatient Medications   Medication Sig Dispense Refill    ondansetron (ZOFRAN-ODT) 8 MG TBDP disintegrating tablet TAKE 1 TABLET BY MOUTH EVERY 8 HOURS AS NEEDED FOR NAUSEA AND VOMITING 8 tablet 3    albuterol (PROVENTIL) (2.5 MG/3ML) 0.083% nebulizer solution Inhale into the lungs      diclofenac sodium (VOLTAREN) 1 % GEL APPLY 1 GRAM TO THE AFFFECTED AREA(S) TWICE A DAY FOR 30 DAYS EXTERNAL TWICE A DAY 30 DAYS      omeprazole (PRILOSEC OTC) 20 MG tablet Take 1 tablet by mouth daily       No current facility-administered medications for this visit.        Allergies   Allergen Reactions    Bee Venom Hives     Swelling ,shortness of breath    Penicillins Hives and Itching     Swelling         Review of Symptoms:       General - No history or complaints of unexpected fever or chills  Head/Neck - No history or complaints of headache or dizziness  Cardiac - No history or complaints of chest pain, palpitations, or shortness of breath  Pulmonary - No history or complaints of shortness of breath or productive cough  Gastrointestinal - as noted above  Genitourinary - No history or complaints of hematuria/dysuria or renal

## 2023-04-22 LAB
25(OH)D3 SERPL-MCNC: 26.7 NG/ML (ref 30–100)
FERRITIN SERPL-MCNC: 20 NG/ML (ref 8–388)
FOLATE SERPL-MCNC: 13.3 NG/ML (ref 3.1–17.5)
IRON SERPL-MCNC: 46 UG/DL (ref 50–175)
VIT B12 SERPL-MCNC: 496 PG/ML (ref 211–911)

## 2023-04-25 LAB — VIT B1 BLD-SCNC: 61.6 NMOL/L (ref 66.5–200)

## 2023-05-22 RX ORDER — OMEPRAZOLE 20 MG/1
CAPSULE, DELAYED RELEASE ORAL
Qty: 30 CAPSULE | Refills: 1 | Status: SHIPPED | OUTPATIENT
Start: 2023-05-22

## 2024-02-15 ENCOUNTER — OFFICE VISIT (OUTPATIENT)
Age: 59
End: 2024-02-15

## 2024-02-15 ENCOUNTER — HOSPITAL ENCOUNTER (OUTPATIENT)
Facility: HOSPITAL | Age: 59
Discharge: HOME | End: 2024-02-15
Payer: COMMERCIAL

## 2024-02-15 VITALS
DIASTOLIC BLOOD PRESSURE: 71 MMHG | TEMPERATURE: 97.1 F | OXYGEN SATURATION: 100 % | WEIGHT: 227.1 LBS | BODY MASS INDEX: 36.5 KG/M2 | SYSTOLIC BLOOD PRESSURE: 128 MMHG | HEART RATE: 86 BPM | HEIGHT: 66 IN

## 2024-02-15 DIAGNOSIS — E55.9 VITAMIN D DEFICIENCY: ICD-10-CM

## 2024-02-15 DIAGNOSIS — D64.9 ANEMIA, UNSPECIFIED TYPE: ICD-10-CM

## 2024-02-15 DIAGNOSIS — Z98.84 S/P GASTRIC BYPASS: ICD-10-CM

## 2024-02-15 DIAGNOSIS — K90.9 INTESTINAL MALABSORPTION, UNSPECIFIED TYPE: Primary | ICD-10-CM

## 2024-02-15 DIAGNOSIS — K90.9 INTESTINAL MALABSORPTION, UNSPECIFIED TYPE: ICD-10-CM

## 2024-02-15 DIAGNOSIS — R79.89 ELEVATED LFTS: ICD-10-CM

## 2024-02-15 DIAGNOSIS — K21.9 GASTROESOPHAGEAL REFLUX DISEASE, UNSPECIFIED WHETHER ESOPHAGITIS PRESENT: ICD-10-CM

## 2024-02-15 LAB
25(OH)D3 SERPL-MCNC: 25.9 NG/ML (ref 30–100)
ALBUMIN SERPL-MCNC: 3.7 G/DL (ref 3.4–5)
ALBUMIN/GLOB SERPL: 1 (ref 0.8–1.7)
ALP SERPL-CCNC: 98 U/L (ref 45–117)
ALT SERPL-CCNC: 38 U/L (ref 13–56)
ANION GAP SERPL CALC-SCNC: 7 MMOL/L (ref 3–18)
AST SERPL-CCNC: 29 U/L (ref 10–38)
BASOPHILS # BLD: 0 K/UL (ref 0–0.1)
BASOPHILS NFR BLD: 1 % (ref 0–2)
BILIRUB SERPL-MCNC: 0.5 MG/DL (ref 0.2–1)
BUN SERPL-MCNC: 13 MG/DL (ref 7–18)
BUN/CREAT SERPL: 16 (ref 12–20)
CALCIUM SERPL-MCNC: 9.8 MG/DL (ref 8.5–10.1)
CHLORIDE SERPL-SCNC: 114 MMOL/L (ref 100–111)
CO2 SERPL-SCNC: 26 MMOL/L (ref 21–32)
CREAT SERPL-MCNC: 0.79 MG/DL (ref 0.6–1.3)
DIFFERENTIAL METHOD BLD: ABNORMAL
EOSINOPHIL # BLD: 0.2 K/UL (ref 0–0.4)
EOSINOPHIL NFR BLD: 3 % (ref 0–5)
ERYTHROCYTE [DISTWIDTH] IN BLOOD BY AUTOMATED COUNT: 14.7 % (ref 11.6–14.5)
FERRITIN SERPL-MCNC: 7 NG/ML (ref 8–388)
FOLATE SERPL-MCNC: 6.9 NG/ML (ref 3.1–17.5)
GLOBULIN SER CALC-MCNC: 3.6 G/DL (ref 2–4)
GLUCOSE SERPL-MCNC: 91 MG/DL (ref 74–99)
HCT VFR BLD AUTO: 38.7 % (ref 35–45)
HGB BLD-MCNC: 12.2 G/DL (ref 12–16)
IMM GRANULOCYTES # BLD AUTO: 0 K/UL (ref 0–0.04)
IMM GRANULOCYTES NFR BLD AUTO: 0 % (ref 0–0.5)
IRON SERPL-MCNC: 51 UG/DL (ref 50–175)
LYMPHOCYTES # BLD: 2.3 K/UL (ref 0.9–3.6)
LYMPHOCYTES NFR BLD: 40 % (ref 21–52)
MCH RBC QN AUTO: 26.4 PG (ref 24–34)
MCHC RBC AUTO-ENTMCNC: 31.5 G/DL (ref 31–37)
MCV RBC AUTO: 83.8 FL (ref 78–100)
MONOCYTES # BLD: 0.4 K/UL (ref 0.05–1.2)
MONOCYTES NFR BLD: 7 % (ref 3–10)
NEUTS SEG # BLD: 3 K/UL (ref 1.8–8)
NEUTS SEG NFR BLD: 50 % (ref 40–73)
NRBC # BLD: 0 K/UL (ref 0–0.01)
NRBC BLD-RTO: 0 PER 100 WBC
PLATELET # BLD AUTO: 187 K/UL (ref 135–420)
PMV BLD AUTO: 12.9 FL (ref 9.2–11.8)
POTASSIUM SERPL-SCNC: 4.5 MMOL/L (ref 3.5–5.5)
PROT SERPL-MCNC: 7.3 G/DL (ref 6.4–8.2)
RBC # BLD AUTO: 4.62 M/UL (ref 4.2–5.3)
SODIUM SERPL-SCNC: 147 MMOL/L (ref 136–145)
VIT B12 SERPL-MCNC: 289 PG/ML (ref 211–911)
WBC # BLD AUTO: 5.9 K/UL (ref 4.6–13.2)

## 2024-02-15 PROCEDURE — 82746 ASSAY OF FOLIC ACID SERUM: CPT

## 2024-02-15 PROCEDURE — 82728 ASSAY OF FERRITIN: CPT

## 2024-02-15 PROCEDURE — 82306 VITAMIN D 25 HYDROXY: CPT

## 2024-02-15 PROCEDURE — 85025 COMPLETE CBC W/AUTO DIFF WBC: CPT

## 2024-02-15 PROCEDURE — 82607 VITAMIN B-12: CPT

## 2024-02-15 PROCEDURE — 36415 COLL VENOUS BLD VENIPUNCTURE: CPT

## 2024-02-15 PROCEDURE — 80053 COMPREHEN METABOLIC PANEL: CPT

## 2024-02-15 PROCEDURE — 83540 ASSAY OF IRON: CPT

## 2024-02-15 PROCEDURE — 84425 ASSAY OF VITAMIN B-1: CPT

## 2024-02-15 RX ORDER — PANTOPRAZOLE SODIUM 40 MG/1
40 TABLET, DELAYED RELEASE ORAL DAILY
Qty: 30 TABLET | Refills: 11 | Status: SHIPPED | OUTPATIENT
Start: 2024-02-15

## 2024-02-15 NOTE — PROGRESS NOTES
Subjective:   Maurisio Alvarez  is a 58 y.o. female who presents for follow-up about 14 months following  laparoscopic conversion of sleeve gastrectomy to gastric bypass .   Surgery related complication: NA.    She has lost a total of 45 pounds since surgery.  Body mass index is 36.65 kg/m²..  Loss of EBW is 33%.      The patient presents today to assess their progress toward their weight loss goal & to address any issues that may be present:   She is tolerating solid foods without difficulty, reports reflux and rare nausea and vomiting if she eats too fast. Happens about twice a month. She denies vomiting, abdominal pain, difficulty swallowing.  Fluid intake: fair                              Protein intake:  1 shake + food; chicken, shrimp, tuna  Eating 3 times a day. Has been having hunger at night.    The patient is taking recommended vitamins.     The patient's exercise level: Doing a [employer] aerobic class for 30 minutes twice weekly.      Changes in her medical history and medications have been reviewed.      Comorbidities:    Hypertension: not applicable  Diabetes: not applicable  Obstructive Sleep Apnea: not applicable  Hyperlipidemia: not applicable  Stress Urinary Incontinence: not applicable  Gastroesophageal Reflux: worsened, off PPI  Weight related arthropathy:unchanged    Patient Active Problem List   Diagnosis    Arthritis of both knees    Chronic back pain    Morbid obesity ([facility])    Asthma    Left foot pain    Intestinal malabsorption    S/P laparoscopic sleeve gastrectomy    Vitamin D deficiency    Morbid obesity with BMI of 45.0-49.9, adult ([facility])    Anemia    DJD (degenerative joint disease)    GERD (gastroesophageal reflux disease)    Morbid obesity with BMI of 40.0-44.9, adult ([facility])    S/P gastric bypass    Elevated LFTs     Past Medical History:   Diagnosis Date    Anemia     Arthritis of both knees     Asthma     acute, maybe use inhaler Q 6 months    Chronic back pain     back & joint pain

## 2024-02-15 NOTE — PATIENT INSTRUCTIONS
[employer] or My Fitness Pal Deborah  80-90g protein  800-1000 calories   Keep carbs below 50g      Patient Instructions      Remember hydration goals - minimum of 64 ounces of liquids per day (dehydration is the number one reason for hospital readmission).  Continue to monitor carbohydrate and protein intake you need a minimum of  Grams of protein daily- remember to keep your total carbohydrates to 50 grams or less per day for best results.  Continue to work towards exercise goals - 60-90 minutes, 5 times a week minimum of deliberate, aerobic exercise is the ultimate goal with strength training 2 times each week. Refer to [email or web address] for  information.  Remember to take vitamins as directed.  Attend support group the 2nd Thursday of each month.  6.  Constipation: Milk of Magnesia is for immediate relief only.  Miralax is to be used every day if constipation is a chronic problem.    7.  Diarrhea: patients will occasionally develop lactose intolerance after surgery.  Check to see if your protein shake has whey in it.  If it does try a protein powder or drink that does not have whey and stop all yogurts, cheeses and milks to see if the diarrhea goes away.    8.  If you have had labs drawn.  We will only call you if you have abnormal results.  Otherwise you can access the lab results in \"[employer]\".  You will only need the access code the first time you sign on.    9.  Call us at [phone] or email us through \"[employer]\" with questions,     concerns or worsening of condition, we have someone on call 24 hours a day.  If you are unable to reach our office, you are to go to your Primary Care Physician or the Emergency Department.     NOTE TO GASTRIC BYPASS PATIENTS:  (SAME APPLIES TO GASTRIC SLEEVE PATIENTS FOR FIRST TWO MONTHS)  Remember that for the rest of your life, you are not able to take the following:  - NSAIDs (ibuprofen, goody powder, BC powder, Motrin, Advil, Mobic, Voltaren, Excedrin,

## 2024-02-22 DIAGNOSIS — D64.9 ANEMIA, UNSPECIFIED TYPE: ICD-10-CM

## 2024-02-22 DIAGNOSIS — Z98.84 S/P LAPAROSCOPIC SLEEVE GASTRECTOMY: Primary | ICD-10-CM

## 2024-02-22 RX ORDER — ERGOCALCIFEROL 1.25 MG/1
50000 CAPSULE ORAL
Qty: 52 CAPSULE | Refills: 1 | Status: SHIPPED | OUTPATIENT
Start: 2024-02-22

## 2024-02-22 RX ORDER — SYRINGE W-NEEDLE,DISPOSAB,3 ML 25GX5/8"
1 SYRINGE, EMPTY DISPOSABLE MISCELLANEOUS
Qty: 12 EACH | Refills: 0 | Status: SHIPPED | OUTPATIENT
Start: 2024-02-22

## 2024-02-22 RX ORDER — CYANOCOBALAMIN 1000 UG/ML
1000 INJECTION, SOLUTION INTRAMUSCULAR; SUBCUTANEOUS
Qty: 1 ML | Refills: 11 | Status: SHIPPED | OUTPATIENT
Start: 2024-02-22

## 2024-02-23 ENCOUNTER — TELEPHONE (OUTPATIENT)
Age: 59
End: 2024-02-23

## 2024-02-23 LAB — VIT B1 BLD-SCNC: 55.1 NMOL/L (ref 66.5–200)

## 2024-02-23 NOTE — TELEPHONE ENCOUNTER
Pt called back to let us know her PNV does not have any iron in it. She is going to buy Fes04 325 mg and take one per day and 500 mg vitamin c 30 mins prior. Pt expresses understanding.

## 2024-02-26 RX ORDER — THIAMINE MONONITRATE (VIT B1) 100 MG
100 TABLET ORAL DAILY
Qty: 30 TABLET | Refills: 3 | Status: SHIPPED | OUTPATIENT
Start: 2024-02-26

## 2024-02-26 NOTE — PROGRESS NOTES
Spoke with patient about B1 level of 55. Sent thiamine 100mg daily. Is starting B50 as well. Repeat level in 3 months

## 2024-03-26 ENCOUNTER — TELEPHONE (OUTPATIENT)
Age: 59
End: 2024-03-26

## 2024-03-26 NOTE — TELEPHONE ENCOUNTER
LMTCB pt had called for a refill of Prilosec, wayne note states Protonix which was sent in for a year.

## 2024-06-12 ENCOUNTER — TELEPHONE (OUTPATIENT)
Age: 59
End: 2024-06-12

## 2024-06-12 DIAGNOSIS — E55.9 VITAMIN D DEFICIENCY: ICD-10-CM

## 2024-06-12 DIAGNOSIS — E53.9 VITAMIN B DEFICIENCY: ICD-10-CM

## 2024-06-12 DIAGNOSIS — K90.9 INTESTINAL MALABSORPTION, UNSPECIFIED TYPE: Primary | ICD-10-CM

## 2024-06-12 DIAGNOSIS — D64.9 ANEMIA, UNSPECIFIED TYPE: ICD-10-CM

## 2024-06-12 DIAGNOSIS — Z98.84 S/P GASTRIC BYPASS: ICD-10-CM

## 2024-06-12 NOTE — TELEPHONE ENCOUNTER
LMTCB re refill of vitamin B 1 and pt needs 18 month post op labs, order placed and pt needs to reschedule follow up appointment.

## 2024-12-03 ENCOUNTER — TELEPHONE (OUTPATIENT)
Age: 59
End: 2024-12-03

## 2025-03-26 ENCOUNTER — TELEPHONE (OUTPATIENT)
Age: 60
End: 2025-03-26

## 2025-03-26 NOTE — TELEPHONE ENCOUNTER
Phone call with pt and she no longer has the bariatric christine with her new insurance, she would have to be self pay. Emailed lab orders to pt to get PCP to draw vitamin labs. Pt expresses understanding.

## (undated) DEVICE — STAPLER INT L37CM STPL 21MM CIR ENDOSCP CRV INTLUMN B FRM

## (undated) DEVICE — TISSUE RETRIEVAL SYSTEM: Brand: INZII RETRIEVAL SYSTEM

## (undated) DEVICE — RELOAD STPL H4.1X2MM DIA60MM THCK TISS GRN 6 ROW PWR GST B

## (undated) DEVICE — RESERVOIR,SUCTION,100CC,SILICONE: Brand: MEDLINE

## (undated) DEVICE — SLEEVE TRCR L100MM DIA5MM UNIV STBL FOR BLDELSS DIL TIP

## (undated) DEVICE — CLIP SUT ENDOSCP F/2-0/3-0/4-0 -- LAPRA-TY

## (undated) DEVICE — SOLUTION LACTATED RINGERS INJECTION USP

## (undated) DEVICE — GLOVE ORANGE PI 8   MSG9080

## (undated) DEVICE — TOTAL TRAY, 16FR 10ML SIL FOLEY, URN: Brand: MEDLINE

## (undated) DEVICE — SUTURE PROL SZ 2-0 L30IN NONABSORBABLE BLU L26MM CT-2 1/2 8411H

## (undated) DEVICE — AGENT HEMSTAT W6XL9IN OXIDIZED REGENERATED CELOS ABSRB FOR

## (undated) DEVICE — KIT SUTURING DEVICE M-CLOSE

## (undated) DEVICE — SUTURE PDS II SZ 0 L27IN ABSRB VLT L26MM CT-2 1/2 CIR Z334H

## (undated) DEVICE — SUTURE MCRYL + SZ 4-0 L27IN ABSRB UD L19MM PS-2 3/8 CIR MCP426H

## (undated) DEVICE — RELOAD STPL H1.8-3.8MM REG THCK TISS G 6 ROW GRIPPING SURF

## (undated) DEVICE — SEALANT TISS 10 CC FOR HUM FIBRIN VISTASEAL

## (undated) DEVICE — APPLIER CLP L SHFT DIA12MM 20 ROT MULT LIGACLP

## (undated) DEVICE — SUTURE ETHIB EXCL BR GRN TAPR PT 2-0 30 X563H X563H

## (undated) DEVICE — FORCEPS BX OVL CUP SERR DISP CAP L 240CM RAD JAW 4

## (undated) DEVICE — RELOAD STPL H1-2.5X45MM VASC THN TISS WHT 6 ROW B FRM SGL

## (undated) DEVICE — TROCAR ENDOSCP L100MM DIA15MM BLDELSS STBL SL ENDOPATH XCEL

## (undated) DEVICE — TROCAR ENDOSCP L100MM DIA12MM STBL SL BLDELSS ENDOPATH XCEL

## (undated) DEVICE — SHEARS LAP L45CM DIA5MM ULTRASONIC CRV TIP ADV HEMSTAS HARM

## (undated) DEVICE — TROCAR LAP L100MM DIA5MM BLDELSS W/ STBL SL ENDOPATH XCEL

## (undated) DEVICE — TUBING, SUCTION, 1/4" X 12', STRAIGHT: Brand: MEDLINE

## (undated) DEVICE — AIR SHEET,LAT,COMFORT GLIDE, BLEND 40X80: Brand: MEDLINE

## (undated) DEVICE — SOL IRRIGATION INJ NACL 0.9% 500ML BTL

## (undated) DEVICE — TROCAR ENDOSCP BLDELSS 12X100 MM W/ HNDL STBL SL OPT TIP

## (undated) DEVICE — STAPLER INT L34CM 60MM LNG ENDOSCP ARTC PWR + ECHELON FLX

## (undated) DEVICE — SUTURE ETHLN SZ 3-0 L30IN NONABSORBABLE BLK FSL L30MM 3/8 1671H

## (undated) DEVICE — ENDOCUT SCISSOR TIP, DISPOSABLE: Brand: RENEW

## (undated) DEVICE — GARMENT,MEDLINE,DVT,INT,CALF,MED, GEN2: Brand: MEDLINE

## (undated) DEVICE — RELOAD STPL L60MM H1-2.6MM MESENTERY THN TISS WHT 6 ROW

## (undated) DEVICE — Device

## (undated) DEVICE — GLOVE ORANGE PI 7 1/2   MSG9075

## (undated) DEVICE — CUTTER ENDOSCP L340MM LIN ARTC SGL STROKE FIRING ENDOPATH

## (undated) DEVICE — SOLUTION SURG PREP 26 CC PURPREP

## (undated) DEVICE — NEEDLE INSUF L150MM DIA2MM DISP FOR PNEUMOPERI ENDOPATH

## (undated) DEVICE — BARIATRIC: Brand: MEDLINE INDUSTRIES, INC.

## (undated) DEVICE — ENDOSCOPY PUMP TUBING/ CAP SET: Brand: ERBE

## (undated) DEVICE — VISUALIZATION SYSTEM: Brand: CLEARIFY

## (undated) DEVICE — SUTURE NONABSORBABLE MONOFILAMENT 3-0 PS-1 18 IN BLK ETHILON 1663H

## (undated) DEVICE — DISPOSABLE SUCTION/IRRIGATOR TUBE SET WITH TIP: Brand: AHTO

## (undated) DEVICE — APPLICATOR LAP 35 CM 2 RIGID VISTASEAL

## (undated) DEVICE — STRIP SKIN CLSR W1XL5IN NYL REINF CURAD

## (undated) DEVICE — [HIGH FLOW INSUFFLATOR,  DO NOT USE IF PACKAGE IS DAMAGED,  KEEP DRY,  KEEP AWAY FROM SUNLIGHT,  PROTECT FROM HEAT AND RADIOACTIVE SOURCES.]: Brand: PNEUMOSURE